# Patient Record
Sex: FEMALE | Race: WHITE | Employment: UNEMPLOYED | URBAN - METROPOLITAN AREA
[De-identification: names, ages, dates, MRNs, and addresses within clinical notes are randomized per-mention and may not be internally consistent; named-entity substitution may affect disease eponyms.]

---

## 2017-01-27 PROBLEM — D86.9 SARCOIDOSIS: Status: ACTIVE | Noted: 2017-01-27

## 2017-06-20 PROBLEM — E11.9 TYPE 2 DIABETES MELLITUS WITHOUT COMPLICATION, WITHOUT LONG-TERM CURRENT USE OF INSULIN (HCC): Status: ACTIVE | Noted: 2017-06-20

## 2018-03-14 ENCOUNTER — HOSPITAL ENCOUNTER (OUTPATIENT)
Age: 58
Discharge: HOME | End: 2018-03-16
Payer: COMMERCIAL

## 2018-03-14 ENCOUNTER — OFFICE VISIT (OUTPATIENT)
Dept: FAMILY MEDICINE CLINIC | Age: 58
End: 2018-03-14
Payer: COMMERCIAL

## 2018-03-14 VITALS
HEIGHT: 62 IN | DIASTOLIC BLOOD PRESSURE: 80 MMHG | RESPIRATION RATE: 16 BRPM | WEIGHT: 215 LBS | OXYGEN SATURATION: 98 % | TEMPERATURE: 97.2 F | HEART RATE: 80 BPM | SYSTOLIC BLOOD PRESSURE: 130 MMHG | BODY MASS INDEX: 39.56 KG/M2

## 2018-03-14 DIAGNOSIS — N30.00 ACUTE CYSTITIS WITHOUT HEMATURIA: Primary | ICD-10-CM

## 2018-03-14 DIAGNOSIS — D86.9 SARCOIDOSIS: ICD-10-CM

## 2018-03-14 DIAGNOSIS — E11.9 TYPE 2 DIABETES MELLITUS WITHOUT COMPLICATION, WITHOUT LONG-TERM CURRENT USE OF INSULIN (HCC): ICD-10-CM

## 2018-03-14 DIAGNOSIS — I10 ESSENTIAL HYPERTENSION: ICD-10-CM

## 2018-03-14 LAB
BILIRUBIN, POC: NORMAL
BLOOD URINE, POC: NORMAL
CLARITY, POC: CLEAR
COLOR, POC: YELLOW
GLUCOSE BLD-MCNC: 102 MG/DL
GLUCOSE URINE, POC: NORMAL
HBA1C MFR BLD: 6 %
KETONES, POC: NORMAL
LEUKOCYTE EST, POC: NORMAL
NITRITE, POC: NORMAL
PH, POC: 5.5
PROTEIN, POC: NORMAL
SPECIFIC GRAVITY, POC: 1
UROBILINOGEN, POC: 0.2

## 2018-03-14 PROCEDURE — G8482 FLU IMMUNIZE ORDER/ADMIN: HCPCS | Performed by: FAMILY MEDICINE

## 2018-03-14 PROCEDURE — G8427 DOCREV CUR MEDS BY ELIG CLIN: HCPCS | Performed by: FAMILY MEDICINE

## 2018-03-14 PROCEDURE — 87088 URINE BACTERIA CULTURE: CPT

## 2018-03-14 PROCEDURE — 3017F COLORECTAL CA SCREEN DOC REV: CPT | Performed by: FAMILY MEDICINE

## 2018-03-14 PROCEDURE — 82962 GLUCOSE BLOOD TEST: CPT | Performed by: FAMILY MEDICINE

## 2018-03-14 PROCEDURE — 83036 HEMOGLOBIN GLYCOSYLATED A1C: CPT | Performed by: FAMILY MEDICINE

## 2018-03-14 PROCEDURE — 99213 OFFICE O/P EST LOW 20 MIN: CPT | Performed by: FAMILY MEDICINE

## 2018-03-14 PROCEDURE — G8417 CALC BMI ABV UP PARAM F/U: HCPCS | Performed by: FAMILY MEDICINE

## 2018-03-14 PROCEDURE — 1036F TOBACCO NON-USER: CPT | Performed by: FAMILY MEDICINE

## 2018-03-14 PROCEDURE — 3044F HG A1C LEVEL LT 7.0%: CPT | Performed by: FAMILY MEDICINE

## 2018-03-14 PROCEDURE — 81003 URINALYSIS AUTO W/O SCOPE: CPT | Performed by: FAMILY MEDICINE

## 2018-03-14 PROCEDURE — 3014F SCREEN MAMMO DOC REV: CPT | Performed by: FAMILY MEDICINE

## 2018-03-16 LAB — URINE CULTURE, ROUTINE: NORMAL

## 2018-06-19 DIAGNOSIS — R73.9 HIGH BLOOD SUGAR: ICD-10-CM

## 2018-08-02 DIAGNOSIS — I10 ESSENTIAL HYPERTENSION: ICD-10-CM

## 2018-08-02 RX ORDER — METOPROLOL SUCCINATE 50 MG/1
50 TABLET, EXTENDED RELEASE ORAL DAILY
Qty: 30 TABLET | Refills: 3 | Status: SHIPPED | OUTPATIENT
Start: 2018-08-02 | End: 2019-06-03 | Stop reason: ALTCHOICE

## 2018-08-03 ENCOUNTER — TELEPHONE (OUTPATIENT)
Dept: FAMILY MEDICINE CLINIC | Age: 58
End: 2018-08-03

## 2018-08-03 DIAGNOSIS — E11.9 TYPE 2 DIABETES MELLITUS WITHOUT COMPLICATION, WITHOUT LONG-TERM CURRENT USE OF INSULIN (HCC): Primary | ICD-10-CM

## 2018-08-03 DIAGNOSIS — R73.9 HIGH BLOOD SUGAR: ICD-10-CM

## 2018-08-20 DIAGNOSIS — I10 ESSENTIAL HYPERTENSION: ICD-10-CM

## 2018-08-20 RX ORDER — SPIRONOLACTONE 25 MG/1
25 TABLET ORAL DAILY
Qty: 90 TABLET | Refills: 0 | Status: SHIPPED | OUTPATIENT
Start: 2018-08-20 | End: 2018-09-04 | Stop reason: SDUPTHER

## 2018-08-21 DIAGNOSIS — E11.9 TYPE 2 DIABETES MELLITUS WITHOUT COMPLICATION, WITHOUT LONG-TERM CURRENT USE OF INSULIN (HCC): ICD-10-CM

## 2018-08-21 NOTE — TELEPHONE ENCOUNTER
Patient called requesting a refill on Metformin 500 mg. Left message asking patient to call the office to clarify directions on the Metformin.

## 2018-09-04 ENCOUNTER — OFFICE VISIT (OUTPATIENT)
Dept: FAMILY MEDICINE CLINIC | Age: 58
End: 2018-09-04
Payer: COMMERCIAL

## 2018-09-04 VITALS
TEMPERATURE: 96.9 F | DIASTOLIC BLOOD PRESSURE: 80 MMHG | OXYGEN SATURATION: 97 % | SYSTOLIC BLOOD PRESSURE: 130 MMHG | HEART RATE: 78 BPM | WEIGHT: 198 LBS | BODY MASS INDEX: 36.44 KG/M2 | HEIGHT: 62 IN | RESPIRATION RATE: 16 BRPM

## 2018-09-04 DIAGNOSIS — F41.9 ANXIETY: ICD-10-CM

## 2018-09-04 DIAGNOSIS — D86.9 SARCOIDOSIS: ICD-10-CM

## 2018-09-04 DIAGNOSIS — L40.9 PSORIASIS: ICD-10-CM

## 2018-09-04 DIAGNOSIS — I10 ESSENTIAL HYPERTENSION: ICD-10-CM

## 2018-09-04 DIAGNOSIS — Z23 NEED FOR VACCINATION: ICD-10-CM

## 2018-09-04 DIAGNOSIS — M79.7 FIBROMYALGIA: ICD-10-CM

## 2018-09-04 DIAGNOSIS — E11.9 TYPE 2 DIABETES MELLITUS WITHOUT COMPLICATION, WITHOUT LONG-TERM CURRENT USE OF INSULIN (HCC): Primary | ICD-10-CM

## 2018-09-04 LAB — HBA1C MFR BLD: 5.7 %

## 2018-09-04 PROCEDURE — 83036 HEMOGLOBIN GLYCOSYLATED A1C: CPT | Performed by: FAMILY MEDICINE

## 2018-09-04 PROCEDURE — G8427 DOCREV CUR MEDS BY ELIG CLIN: HCPCS | Performed by: FAMILY MEDICINE

## 2018-09-04 PROCEDURE — 3017F COLORECTAL CA SCREEN DOC REV: CPT | Performed by: FAMILY MEDICINE

## 2018-09-04 PROCEDURE — 2022F DILAT RTA XM EVC RTNOPTHY: CPT | Performed by: FAMILY MEDICINE

## 2018-09-04 PROCEDURE — 90688 IIV4 VACCINE SPLT 0.5 ML IM: CPT | Performed by: FAMILY MEDICINE

## 2018-09-04 PROCEDURE — 1036F TOBACCO NON-USER: CPT | Performed by: FAMILY MEDICINE

## 2018-09-04 PROCEDURE — 3044F HG A1C LEVEL LT 7.0%: CPT | Performed by: FAMILY MEDICINE

## 2018-09-04 PROCEDURE — 90471 IMMUNIZATION ADMIN: CPT | Performed by: FAMILY MEDICINE

## 2018-09-04 PROCEDURE — 99214 OFFICE O/P EST MOD 30 MIN: CPT | Performed by: FAMILY MEDICINE

## 2018-09-04 PROCEDURE — G8417 CALC BMI ABV UP PARAM F/U: HCPCS | Performed by: FAMILY MEDICINE

## 2018-09-04 RX ORDER — HYDROCHLOROTHIAZIDE 25 MG/1
TABLET ORAL
Qty: 90 TABLET | Refills: 1 | Status: CANCELLED | OUTPATIENT
Start: 2018-09-04

## 2018-09-04 RX ORDER — SPIRONOLACTONE 25 MG/1
25 TABLET ORAL DAILY
Qty: 90 TABLET | Refills: 1 | Status: SHIPPED | OUTPATIENT
Start: 2018-09-04 | End: 2019-05-29 | Stop reason: SDUPTHER

## 2018-09-04 ASSESSMENT — PATIENT HEALTH QUESTIONNAIRE - PHQ9
SUM OF ALL RESPONSES TO PHQ9 QUESTIONS 1 & 2: 0
2. FEELING DOWN, DEPRESSED OR HOPELESS: 0
SUM OF ALL RESPONSES TO PHQ QUESTIONS 1-9: 0
1. LITTLE INTEREST OR PLEASURE IN DOING THINGS: 0
SUM OF ALL RESPONSES TO PHQ QUESTIONS 1-9: 0

## 2018-09-04 NOTE — PROGRESS NOTES
9/4/2018    Chief Complaint   Patient presents with    Diabetes     routine 6 [facility] check up no complaints     Hypertension    Flu Vaccine              Patient Active Problem List    Diagnosis Date Noted    Type 2 diabetes mellitus without complication, without long-term current use of insulin ([facility] 06/20/2017    Sarcoidosis 01/27/2017    Insomnia 06/17/2016    Anxiety 04/20/2015    Fibromyalgia 04/20/2015    Psoriasis 04/20/2015    SOB (shortness of breath) 04/20/2015    Diplopia 03/11/2014    HTN (hypertension) 03/11/2014     Subjective       Turn around with decreased weight  physian in [facility]  Auto immune solution less gluten and dairy    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 Type 2 diabetes mellitus without complication, without long-term current use of insulin ([facility])    - POCT glycosylated hemoglobin (Hb A1C)  -  DIABETES FOOT EXAM    2. Essential hypertension  Vitals:    09/04/18 0931   BP: 130/80   Pulse: 78   Resp: 16   Temp: 96.9 °F (36.1 °C)   SpO2: 97%       - spironolactone (ALDACTONE) 25 MG tablet; Take 1 tablet by mouth daily  Dispense: 90 tablet; Refill: 1  - hydrochlorothiazide (HYDRODIURIL) 25 MG tablet; TAKE 1 TABLET BY MOUTH ONCE A DAY  Dispense: 90 tablet; Refill: 1    3. Anxiety  ok    4. Fibromyalgia  ok    5. Psoriasis  ok    6.  Sarcoidosis  om        Goals      Blood Pressure < 140/90      HEMOGLOBIN A1C < 7.0      Reduce fat intake to X grams per day      Weight (lb) < 200         Goals      ROS     Review Of Systems    General:  No weight change no malaise no fatigue no change in appetite no sleep disturbance no fever/chills no night sweats  Skin:                 no abnormal pigmentation, no rash, no scaling,no itching,no Masses,no  hair or nail changes  Eyes:               no blurring, no diplopia, no  eye pain no glaucoma no cataracts  ENT:                 no hearing loss,no  Tinnitus,no  Vertigo,no 'chest cold'       Social History     Social History    Marital status:      Spouse name: N/A    Number of children: N/A    Years of education: N/A     Occupational History    Not on file. Social History Main Topics    Smoking status: Former Smoker     Packs/day: 0.20     Years: 1.00     Types: Cigarettes     Quit date: 1/1/1980    Smokeless tobacco: Never Used    Alcohol use No    Drug use: No    Sexual activity: Not on file     Other Topics Concern    Not on file     Social History Narrative    No narrative on file           Scheduled Meds:  Current Outpatient Prescriptions   Medication Sig Dispense Refill    metFORMIN (GLUCOPHAGE) 500 MG tablet Take 1 tablet by mouth daily (with breakfast) 90 tablet 1    spironolactone (ALDACTONE) 25 MG tablet Take 1 tablet by mouth daily 90 tablet 0    blood glucose test strips (FREESTYLE TEST STRIPS) strip 1 each by In Vitro route daily As needed. 100 each 3    metoprolol succinate (TOPROL XL) 50 MG extended release tablet Take 1 tablet by mouth daily 30 tablet 3    hydrochlorothiazide (HYDRODIURIL) 25 MG tablet TAKE 1 TABLET BY MOUTH ONCE A DAY 90 tablet 0    gabapentin (NEURONTIN) 300 MG capsule Take 300 mg by mouth 4 times daily       Handicap Placard MISC by Does not apply route 1 each 0    Lancets MISC Test daily 100 each 3    glucose monitoring kit (FREESTYLE) monitoring kit 1 kit by Does not apply route daily 1 kit 0    FREESTYLE LANCETS MISC 1 each by Does not apply route daily 159 each 3    folic acid (FOLVITE) 1 MG tablet Take 1 mg by mouth      sodium chloride, PF, 0.9 % injection Inject 10 ml intravenously as directed. Flush mediport with 10 cc of 0.9% Normal Saline pre and post infusion. Delma Rowe \"INFUSION PROTOCOL-DO NOT DELETE\"      acetaminophen (TYLENOL) 650 MG CR tablet INFUSION PROTOCOL DO NOT DELETEGive 650 mg po prn for temperature above 38 degrees Celsius for one dose only.       acetaminophen (TYLENOL) 325 MG tablet Take 650 mg by mouth      inFLIXimab (REMICADE) 100 MG injection Give initial infusion week 0, week 2, and every 4 weeks there after. Inject intravenously, give 5mg/kg IV in 250 ml Normal Saline. Infuse over 2 hours. No current facility-administered medications for this visit. Wt Readings from Last 3 Encounters:   09/04/18 198 lb (89.8 kg)   03/14/18 215 lb (97.5 kg)   01/19/18 215 lb 6.4 oz (97.7 kg)         Vitals:    09/04/18 0931   BP: 130/80   Pulse: 78   Resp: 16   Temp: 96.9 °F (36.1 °C)   SpO2: 97%         Physical Exam    Physical Exam:    General: No obesity, no cachexia  Skin:    Warm and dry. Not pale, not flushed , no lesions, No rash , no bruises  HEENT:   PERRLA, EOMI. Conjunctiva clear, no  Drainage, no jaundice, ext canals normal ,no cerumen impaction,TM's normal ,not injected ,no fluid . Normal nasal mucosa not boggy , not inflamed . Septum normal, turbinates not swollen, oral mucosa moist , lips normal, teeth normal, tongue normal, salivary glands normal, no sinus tenderness, tmj normal, throat no injected, no cobblestoning, no drainage , tonsils normal no exudates  Neck:    Supple,No JVD, No thyromegaly, No carotid bruit, no adenopathy  Cardiac:   PMI Normal,  RRR, No gallop , no  murmur. No S3, no S4, no abd aortic bruit, normal pulses, , no pedal edema  Lungs:   CTA, symmetrical,  No wheezes no rales, Normal percussion, no use of accessory muscles,   Abdomen:  Normal bowel sounds, abd soft, non-tender, no rebound no guarding,no hepatomegaly, no splenomegaly, no hernia, no ascites, normal palpation  Extremities:   No clubbing, no edema no cyanosis.  Pedal pulses ok  M/S:  Back normal,no kyphosis, no cva tenderness,  Head and neck normal rom, shoulders normal strength , , hips normal , gait normal , no cane or walker , no motor deficits, no clubbing, normal nails  Neurological:   A & O x 3, Moves all extremities,  No gross neuro deficits ,normal DTR, normal memory, normal

## 2018-12-04 DIAGNOSIS — E11.9 TYPE 2 DIABETES MELLITUS WITHOUT COMPLICATION, WITHOUT LONG-TERM CURRENT USE OF INSULIN (HCC): ICD-10-CM

## 2019-05-16 DIAGNOSIS — I10 ESSENTIAL HYPERTENSION: ICD-10-CM

## 2019-05-16 RX ORDER — SPIRONOLACTONE 25 MG/1
25 TABLET ORAL DAILY
Qty: 90 TABLET | Refills: 1 | OUTPATIENT
Start: 2019-05-16

## 2019-05-29 DIAGNOSIS — I10 ESSENTIAL HYPERTENSION: ICD-10-CM

## 2019-05-29 RX ORDER — SPIRONOLACTONE 25 MG/1
25 TABLET ORAL DAILY
Qty: 30 TABLET | Refills: 0 | Status: SHIPPED | OUTPATIENT
Start: 2019-05-29 | End: 2019-06-03 | Stop reason: SDUPTHER

## 2019-06-03 ENCOUNTER — OFFICE VISIT (OUTPATIENT)
Dept: FAMILY MEDICINE CLINIC | Age: 59
End: 2019-06-03
Payer: MEDICAID

## 2019-06-03 VITALS
WEIGHT: 205.8 LBS | SYSTOLIC BLOOD PRESSURE: 126 MMHG | BODY MASS INDEX: 35.13 KG/M2 | HEART RATE: 94 BPM | HEIGHT: 64 IN | DIASTOLIC BLOOD PRESSURE: 64 MMHG | TEMPERATURE: 98.5 F | RESPIRATION RATE: 16 BRPM | OXYGEN SATURATION: 98 %

## 2019-06-03 DIAGNOSIS — I10 ESSENTIAL HYPERTENSION: ICD-10-CM

## 2019-06-03 DIAGNOSIS — E11.9 TYPE 2 DIABETES MELLITUS WITHOUT COMPLICATION, WITHOUT LONG-TERM CURRENT USE OF INSULIN (HCC): Primary | ICD-10-CM

## 2019-06-03 DIAGNOSIS — E78.00 HYPERCHOLESTEROLEMIA: ICD-10-CM

## 2019-06-03 DIAGNOSIS — D86.9 SARCOIDOSIS: ICD-10-CM

## 2019-06-03 DIAGNOSIS — M79.7 FIBROMYALGIA: ICD-10-CM

## 2019-06-03 DIAGNOSIS — Z12.39 SCREENING BREAST EXAMINATION: ICD-10-CM

## 2019-06-03 LAB
CREATININE URINE POCT: 200
HBA1C MFR BLD: 5.7 %
MICROALBUMIN/CREAT 24H UR: 10 MG/G{CREAT}
MICROALBUMIN/CREAT UR-RTO: <30

## 2019-06-03 PROCEDURE — 99214 OFFICE O/P EST MOD 30 MIN: CPT | Performed by: FAMILY MEDICINE

## 2019-06-03 PROCEDURE — 82044 UR ALBUMIN SEMIQUANTITATIVE: CPT | Performed by: FAMILY MEDICINE

## 2019-06-03 PROCEDURE — 83036 HEMOGLOBIN GLYCOSYLATED A1C: CPT | Performed by: FAMILY MEDICINE

## 2019-06-03 RX ORDER — METHOTREXATE 25 MG/ML
INJECTION, SOLUTION INTRA-ARTERIAL; INTRAMUSCULAR; INTRAVENOUS
Refills: 3 | COMMUNITY
Start: 2019-05-03

## 2019-06-03 RX ORDER — SPIRONOLACTONE 25 MG/1
25 TABLET ORAL DAILY
Qty: 90 TABLET | Refills: 1 | Status: SHIPPED | OUTPATIENT
Start: 2019-06-03 | End: 2019-12-16 | Stop reason: SDUPTHER

## 2019-06-03 RX ORDER — ACYCLOVIR 400 MG/1
TABLET ORAL
Refills: 3 | COMMUNITY
Start: 2019-05-07

## 2019-06-03 NOTE — PROGRESS NOTES
6/3/2019    Chief Complaint   Patient presents with    Diabetes     Pt here for medication check-up -- POCT HgbA1c & Micro-Albumin done     Medication Refill    Other     Pt refused mammogram order              Patient Active Problem List    Diagnosis Date Noted    Type 2 diabetes mellitus without complication, without long-term current use of insulin ([facility] 06/20/2017    Sarcoidosis 01/27/2017    Insomnia 06/17/2016    Anxiety 04/20/2015    Fibromyalgia 04/20/2015    Psoriasis 04/20/2015    SOB (shortness of breath) 04/20/2015    Diplopia 03/11/2014    HTN (hypertension) 03/11/2014     Subjective    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 Type 2 diabetes mellitus without complication, without long-term current use of insulin ([facility])  Lab Results   Component Value Date    LABA1C 5.7 06/03/2019     No results found for: EAG    - POCT glycosylated hemoglobin (Hb A1C)  - POCT microalbumin  - metFORMIN (GLUCOPHAGE) 500 MG tablet; Take 1 tablet by mouth daily (with breakfast)  Dispense: 90 tablet; Refill: 1    2. Essential hypertension  ,vitals  Vitals:    06/03/19 1536   BP: 126/64   Pulse: 94   Resp: 16   Temp: 98.5 °F (36.9 °C)   SpO2: 98%       - spironolactone (ALDACTONE) 25 MG tablet; Take 1 tablet by mouth daily  Dispense: 90 tablet; Refill: 1    3. Sarcoidosis      4. Fibromyalgia    another MRI brain for blood vessels  Neuropathy not doing well  Biopsy of nerv no results  Sweat test for nerve. neurocoid is a possibility.     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        Goals      Blood Pressure < 140/90      HEMOGLOBIN A1C < 7.0      Reduce fat intake to X grams per day      Weight (lb) < 200         Goals      Review of Systems   Constitutional: Positive for Minutes per session: Not on file    Stress: Not on file   Relationships    Social connections:     Talks on phone: Not on file     Gets together: Not on file     Attends [employer] service: Not on file     Active member of club or organization: Not on file     Attends meetings of clubs or organizations: Not on file     Relationship status: Not on file    Intimate partner violence:     Fear of current or ex partner: Not on file     Emotionally abused: Not on file     Physically abused: Not on file     Forced sexual activity: Not on file   Other Topics Concern    Not on file   Social History Narrative    Not on file           Scheduled Meds:     Current Outpatient Medications   Medication Sig Dispense Refill    spironolactone (ALDACTONE) 25 MG tablet Take 1 tablet by mouth daily 30 tablet 0    metFORMIN (GLUCOPHAGE) 500 MG tablet Take 1 tablet by mouth daily (with breakfast) 90 tablet 1    blood glucose test strips (FREESTYLE TEST STRIPS) strip 1 each by In Vitro route daily As needed. 100 each 3    Handicap Placard MISC by Does not apply route 1 each 0    Lancets MISC Test daily 100 each 3    glucose monitoring kit (FREESTYLE) monitoring kit 1 kit by Does not apply route daily 1 kit 0    FREESTYLE LANCETS MISC 1 each by Does not apply route daily 512 each 3    folic acid (FOLVITE) 1 MG tablet Take 1 mg by mouth      sodium chloride, PF, 0.9 % injection Inject 10 ml intravenously as directed. Flush mediport with 10 cc of 0.9% Normal Saline pre and post infusion. Leila Chow \"INFUSION PROTOCOL-DO NOT DELETE\"      inFLIXimab (REMICADE) 100 MG injection Give initial infusion week 0, week 2, and every 4 weeks there after. Inject intravenously, give 5mg/kg IV in 250 ml Normal Saline. Infuse over 2 hours.       metoprolol succinate (TOPROL XL) 50 MG extended release tablet Take 1 tablet by mouth daily 30 tablet 3    hydrochlorothiazide (HYDRODIURIL) 25 MG tablet TAKE 1 TABLET BY MOUTH ONCE A DAY 90 tablet 0    gabapentin (NEURONTIN) 300 MG capsule Take 300 mg by mouth 4 times daily       acetaminophen (TYLENOL) 650 MG CR tablet INFUSION PROTOCOL DO NOT DELETEGive 650 mg po prn for temperature above 38 degrees Celsius for one dose only.  acetaminophen (TYLENOL) 325 MG tablet Take 650 mg by mouth       No current facility-administered medications for this visit. Wt Readings from Last 3 Encounters:   06/03/19 205 lb 12.8 oz (93.4 kg)   09/04/18 198 lb (89.8 kg)   03/14/18 215 lb (97.5 kg)         Vitals:    06/03/19 1536   BP: 126/64   Pulse: 94   Resp: 16   Temp: 98.5 °F (36.9 °C)   SpO2: 98%         Physical Exam    Physical Exam:    General: obesity,   Skin:    Warm and dry. Not pale, not flushed , no lesions, No rash , no bruises  HEENT:   PERRLA, EOMI. Conjunctiva clear, no  Drainage, no jaundice, ext canals normal ,no cerumenimpaction,TM's normal ,not injected ,no fluid . throat no injected, no cobblestoning, no drainage , tonsils normal no exudates  Neck:    Supple,No JVD, No thyromegaly, No carotid bruit, no adenopathy  Cardiac:   PMI Normal,RRR, No gallop , no  murmur. No S3, no S4, no abd aortic bruit, normal pulses, , no pedal edema  Lungs:   CTA, symmetrical,  No wheezes no rales, Normal percussion, no use of accessory muscles,   Abdomen:  Normal bowel sounds, abd soft, non-tender, no rebound no guarding,no hepatomegaly, no splenomegaly, no hernia, no ascites, normal palpation  Extremities:   No clubbing, no edema no cyanosis. Pedal pulses ok  M/S:  Back normal,no kyphosis, no cva tenderness,  Head and neck normal rom, shoulders normal strength , , hips normal , gait normal , no cane or walker , no motor deficits, no clubbing, normal nails  Neurological: A & O x 3, Moves all extremities,  No gross neuro deficits ,normal DTR, normal memory, normal judgement/insight, normal affect       Assessment:    1.  Type 2 diabetes mellitus without complication, without long-term current use of insulin ([facility]  Stable, cont meds recheck 6 mos    - POCT glycosylated hemoglobin (Hb A1C)  - POCT microalbumin  - metFORMIN (GLUCOPHAGE) 500 MG tablet; Take 1 tablet by mouth daily (with breakfast)  Dispense: 90 tablet; Refill: 1    2. Essential hypertension  Stable, cont meds recheck 6 mos    - spironolactone (ALDACTONE) 25 MG tablet; Take 1 tablet by mouth daily  Dispense: 90 tablet; Refill: 1    3. Sarcoidosis  Stable, cont meds recheck 6 mos      4. Fibromyalgia  Stable, cont meds recheck 6 mos                        4. Reviewed labs    5. No follow-ups on file.            Sheryl James M.D.    6/3/2019

## 2019-06-04 ENCOUNTER — HOSPITAL ENCOUNTER (OUTPATIENT)
Dept: MAMMOGRAPHY | Age: 59
Discharge: HOME | End: 2019-06-06
Payer: MEDICAID

## 2019-06-04 DIAGNOSIS — Z12.39 SCREENING BREAST EXAMINATION: ICD-10-CM

## 2019-06-04 PROCEDURE — 77063 BREAST TOMOSYNTHESIS BI: CPT

## 2019-06-06 LAB
CHOLESTEROL, TOTAL: 237 MG/DL
CHOLESTEROL/HDL RATIO: 3.34
HDLC SERPL-MCNC: 71 MG/DL (ref 35–70)
LDL CHOLESTEROL CALCULATED: 143 MG/DL (ref 0–160)
TRIGL SERPL-MCNC: 116 MG/DL
VLDLC SERPL CALC-MCNC: 23 MG/DL

## 2019-06-11 DIAGNOSIS — E78.00 HYPERCHOLESTEROLEMIA: ICD-10-CM

## 2019-06-11 ASSESSMENT — ENCOUNTER SYMPTOMS
SHORTNESS OF BREATH: 1
CHOKING: 0
STRIDOR: 0
CHEST TIGHTNESS: 0
COUGH: 0
GASTROINTESTINAL NEGATIVE: 1

## 2019-10-07 ENCOUNTER — NURSE ONLY (OUTPATIENT)
Dept: FAMILY MEDICINE CLINIC | Age: 59
End: 2019-10-07
Payer: MEDICAID

## 2019-10-07 DIAGNOSIS — Z23 NEED FOR INFLUENZA VACCINATION: Primary | ICD-10-CM

## 2019-10-07 PROCEDURE — 90653 IIV ADJUVANT VACCINE IM: CPT | Performed by: FAMILY MEDICINE

## 2019-10-07 PROCEDURE — 90471 IMMUNIZATION ADMIN: CPT | Performed by: FAMILY MEDICINE

## 2019-12-03 ENCOUNTER — HOSPITAL ENCOUNTER (OUTPATIENT)
Age: 59
Discharge: HOME | End: 2019-12-05
Payer: MEDICAID

## 2019-12-03 ENCOUNTER — OFFICE VISIT (OUTPATIENT)
Dept: FAMILY MEDICINE CLINIC | Age: 59
End: 2019-12-03
Payer: MEDICAID

## 2019-12-03 VITALS
WEIGHT: 204.4 LBS | SYSTOLIC BLOOD PRESSURE: 130 MMHG | RESPIRATION RATE: 16 BRPM | TEMPERATURE: 98.1 F | BODY MASS INDEX: 35.09 KG/M2 | DIASTOLIC BLOOD PRESSURE: 78 MMHG | OXYGEN SATURATION: 98 % | HEART RATE: 87 BPM

## 2019-12-03 DIAGNOSIS — I10 ESSENTIAL HYPERTENSION: ICD-10-CM

## 2019-12-03 DIAGNOSIS — D86.9 SARCOIDOSIS: ICD-10-CM

## 2019-12-03 DIAGNOSIS — R35.0 URINARY FREQUENCY: Primary | ICD-10-CM

## 2019-12-03 DIAGNOSIS — M79.7 FIBROMYALGIA: ICD-10-CM

## 2019-12-03 DIAGNOSIS — R35.0 URINARY FREQUENCY: ICD-10-CM

## 2019-12-03 DIAGNOSIS — R06.02 SOB (SHORTNESS OF BREATH): ICD-10-CM

## 2019-12-03 DIAGNOSIS — E11.9 TYPE 2 DIABETES MELLITUS WITHOUT COMPLICATION, WITHOUT LONG-TERM CURRENT USE OF INSULIN (HCC): ICD-10-CM

## 2019-12-03 LAB
BILIRUBIN, POC: NORMAL
BLOOD URINE, POC: NORMAL
CLARITY, POC: CLEAR
COLOR, POC: YELLOW
GLUCOSE URINE, POC: NORMAL
HBA1C MFR BLD: 5.3 %
KETONES, POC: NORMAL
LEUKOCYTE EST, POC: NORMAL
NITRITE, POC: NORMAL
PH, POC: 7
PROTEIN, POC: NORMAL
SPECIFIC GRAVITY, POC: <=1.005
UROBILINOGEN, POC: 0.2

## 2019-12-03 PROCEDURE — 87077 CULTURE AEROBIC IDENTIFY: CPT

## 2019-12-03 PROCEDURE — 87088 URINE BACTERIA CULTURE: CPT

## 2019-12-03 PROCEDURE — 99214 OFFICE O/P EST MOD 30 MIN: CPT | Performed by: FAMILY MEDICINE

## 2019-12-03 PROCEDURE — 83036 HEMOGLOBIN GLYCOSYLATED A1C: CPT | Performed by: FAMILY MEDICINE

## 2019-12-03 PROCEDURE — 87186 SC STD MICRODIL/AGAR DIL: CPT

## 2019-12-03 PROCEDURE — 81002 URINALYSIS NONAUTO W/O SCOPE: CPT | Performed by: FAMILY MEDICINE

## 2019-12-03 ASSESSMENT — PATIENT HEALTH QUESTIONNAIRE - PHQ9
SUM OF ALL RESPONSES TO PHQ QUESTIONS 1-9: 1
SUM OF ALL RESPONSES TO PHQ QUESTIONS 1-9: 1
SUM OF ALL RESPONSES TO PHQ9 QUESTIONS 1 & 2: 1
1. LITTLE INTEREST OR PLEASURE IN DOING THINGS: 0
2. FEELING DOWN, DEPRESSED OR HOPELESS: 1

## 2019-12-05 LAB
ORGANISM: ABNORMAL
URINE CULTURE, ROUTINE: ABNORMAL

## 2019-12-16 ENCOUNTER — TELEPHONE (OUTPATIENT)
Dept: FAMILY MEDICINE CLINIC | Age: 59
End: 2019-12-16

## 2019-12-16 DIAGNOSIS — I10 ESSENTIAL HYPERTENSION: ICD-10-CM

## 2019-12-16 DIAGNOSIS — E11.9 TYPE 2 DIABETES MELLITUS WITHOUT COMPLICATION, WITHOUT LONG-TERM CURRENT USE OF INSULIN (HCC): ICD-10-CM

## 2019-12-16 RX ORDER — SPIRONOLACTONE 25 MG/1
TABLET ORAL
Qty: 90 TABLET | Refills: 1 | Status: SHIPPED
Start: 2019-12-16 | End: 2020-06-17 | Stop reason: SDUPTHER

## 2019-12-17 ENCOUNTER — TELEPHONE (OUTPATIENT)
Dept: FAMILY MEDICINE CLINIC | Age: 59
End: 2019-12-17

## 2019-12-17 DIAGNOSIS — I10 ESSENTIAL HYPERTENSION: Primary | ICD-10-CM

## 2019-12-21 DIAGNOSIS — E11.9 TYPE 2 DIABETES MELLITUS WITHOUT COMPLICATION, WITHOUT LONG-TERM CURRENT USE OF INSULIN (HCC): ICD-10-CM

## 2020-06-17 RX ORDER — SPIRONOLACTONE 25 MG/1
25 TABLET ORAL DAILY
Qty: 90 TABLET | Refills: 1 | Status: SHIPPED
Start: 2020-06-17 | End: 2020-12-21 | Stop reason: SDUPTHER

## 2020-06-24 ENCOUNTER — VIRTUAL VISIT (OUTPATIENT)
Dept: FAMILY MEDICINE CLINIC | Age: 60
End: 2020-06-24
Payer: MEDICARE

## 2020-06-24 PROBLEM — E55.9 VITAMIN D DEFICIENCY: Status: ACTIVE | Noted: 2020-06-24

## 2020-06-24 PROBLEM — E78.00 HYPERCHOLESTEROLEMIA: Status: ACTIVE | Noted: 2020-06-24

## 2020-06-24 PROBLEM — D84.9 IMMUNOSUPPRESSED STATUS (HCC): Status: ACTIVE | Noted: 2020-06-24

## 2020-06-24 PROCEDURE — 99214 OFFICE O/P EST MOD 30 MIN: CPT | Performed by: FAMILY MEDICINE

## 2020-06-24 ASSESSMENT — PATIENT HEALTH QUESTIONNAIRE - PHQ9
SUM OF ALL RESPONSES TO PHQ QUESTIONS 1-9: 0
2. FEELING DOWN, DEPRESSED OR HOPELESS: 0
1. LITTLE INTEREST OR PLEASURE IN DOING THINGS: 0
SUM OF ALL RESPONSES TO PHQ QUESTIONS 1-9: 0
SUM OF ALL RESPONSES TO PHQ9 QUESTIONS 1 & 2: 0

## 2020-06-24 NOTE — PROGRESS NOTES
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 Authorizing Provider   metFORMIN (GLUCOPHAGE) 500 MG tablet Take 1 tablet by mouth daily (with breakfast) Yes Sharona Doherty MD   spironolactone (ALDACTONE) 25 MG tablet Take 1 tablet by mouth daily Yes Sharona Doherty MD   methotrexate Sodium (RHEUMATREX) 50 MG/2ML chemo injection INJECT 0.6 ML SUBCUTANEOUSLY ONCE EACH WEEK. Yes Historical Provider, MD   acyclovir (ZOVIRAX) 400 MG tablet TAKE 1 TABLET BY MOUTH EVERY 12 HOURS. Yes Historical Provider, MD   blood glucose test strips (FREESTYLE TEST STRIPS) strip 1 each by In Vitro route daily As needed.  Yes Sharona Doherty MD   Handicap Placard MISC by Does not apply route Yes Sharona Doherty MD   Lancets MISC Test daily Yes Sharona Doherty MD   glucose monitoring kit (FREESTYLE) monitoring kit 1 kit by Does not apply route daily Yes Sharona Doherty MD   FREESTYLE LANCETS MISC 1 each by Does not apply route daily Yes Sharona Doherty MD   folic acid (FOLVITE) 1 MG tablet Take 1 mg by mouth Yes Historical Provider, MD   sodium chloride, PF, 0.9 % injection Inject 10 ml intravenously as directed. Flush mediport with 10 cc of 0.9% Normal Saline pre and post infusion. Barbara Betancourt \"INFUSION PROTOCOL-DO NOT DELETE\" Yes Historical Provider, MD   inFLIXimab (REMICADE) 100 MG injection Give initial infusion week 0, week 2, and every 4 weeks there after. Inject intravenously, give 5mg/kg IV in 250 ml Normal Saline. Infuse over 2 hours. Yes Historical Provider, MD   acetaminophen (TYLENOL) 650 MG CR tablet INFUSION PROTOCOL DO NOT DELETEGive 650 mg po prn for temperature above 38 degrees Celsius for one dose only.   Historical Provider, MD   acetaminophen (TYLENOL) 325 MG tablet Take 650 mg by mouth  Historical Provider, MD       Social History     Tobacco Use    Smoking status: Former Smoker     Packs/day: 0.20     Years: 1.00     Pack years: 0.20     Types: Cigarettes     Last attempt to quit: 1980     Years since quittin.5    Smokeless tobacco: Never Used   Substance Use Topics    Alcohol use: No    Drug use: No        Allergies   Allergen Reactions    Hctz [Hydrochlorothiazide]      Unknown reaction    Phenytoin Sodium Extended Nausea Only    Sulfa Antibiotics      Unknown reaction   ,   Past Medical History:   Diagnosis Date    Abdominal pain     Anxiety     Diplopia     not currently    Fibromyalgia     Heartburn     HTN (hypertension)     PMR (polymyalgia rheumatica) (HCC)     Psoriasis     Shortness of breath     with 'chest cold'       PHYSICAL EXAMINATION:  [ INSTRUCTIONS:  \"[x]\" Indicates a positive item  \"[]\" Indicates a negative item  -- DELETE ALL ITEMS NOT EXAMINED]  Vital Signs: (As obtained by patient/caregiver or practitioner observation)    Blood pressure-  Heart rate-    Respiratory rate-    Temperature-  Pulse oximetry- Constitutional: [x] Appears well-developed and well-nourished [x] No apparent distress      [] Abnormal-   Mental status  [x] Alert and awake  [x] Oriented to person/place/time [x]Able to follow commands      Eyes:  EOM    []  Normal  [] Abnormal-  Sclera  []  Normal  [] Abnormal -         Discharge []  None visible  [] Abnormal -    HENT:   [x] Normocephalic, atraumatic. [] Abnormal   [] Mouth/Throat: Mucous membranes are moist.     External Ears [] Normal  [] Abnormal-     Neck: [] No visualized mass     Pulmonary/Chest: [x] Respiratory effort normal.  [] No visualized signs of difficulty breathing or respiratory distress        [] Abnormal-      Musculoskeletal:   [] Normal gait with no signs of ataxia         [] Normal range of motion of neck        [] Abnormal-       Neurological:        [x] No Facial Asymmetry (Cranial nerve 7 motor function) (limited exam to video visit)          [] No gaze palsy        [] Abnormal-         Skin:        [] No significant exanthematous lesions or discoloration noted on facial skin         [] Abnormal-            Psychiatric:       [x] Normal Affect [] No Hallucinations        [] Abnormal-     Other pertinent observable physical exam findings-     ASSESSMENT/PLAN:  1. Essential hypertension  Has bp checked at [address]    2. Fibromyalgia  Stable unchanged    3. Psoriasis      4. Sarcoidosis  Sees CC and OPHTHO for eval  On rheumatrix for this   On remicade  . 5. Type 2 diabetes mellitus without complication, without long-term current use of insulin ([facility]  . Lab Results   Component Value Date    LABA1C 5.3 12/03/2019     No results found for: EAG    - Hemoglobin A1C; Future  - MICROALBUMIN / CREATININE URINE RATIO; Future    6. Hypercholesterolemia  levated last year   No meds   Will recheck and mail rx  - Lipid Panel; [facility]    7. Vitamin D deficiency  19 from last fall on vit d but not in summer   Will recheck   - Vitamin D 25 Hydroxy; Future    8.  Immunosuppressed status [facility])  On mtx and remicaid through [facility]      Return in about 6 months (around 12/24/2020). TeleMedicine video visit    This visit was performed as a virtual video visit using a synchronous, two-way, audio-video telehealth technology platform. Patient identification was verified at the start of the visit, including the patient's telephone number and physical location. I discussed with the patient the nature of our telehealth visits, that:     1. Due to the nature of an audio- video modality, the only components of a physical exam that could be done are the elements supported by direct observation. 2. I would evaluate the patient and recommend diagnostics and treatments based on my assessment. 3. If it was felt that the patient should be evaluated in clinic or an emergency room setting, then they would be directed there. 4. Our sessions are not being recorded and that personal health information is protected. 5. Our team would provide follow up care in person if/when the patient needs it. Patient does agree to proceed with telemedicine consultation. Patient's location: home address in [facility]  Physician  location other address in [facility] other people involved in call  no        Time spent: Greater than Not billed by time    This visit was completed virtually using [employer]. me    --Sadia Bowman MD on 6/24/2020 at 11:16 AM    An electronic signature was used to authenticate this note.

## 2020-07-30 LAB
AVERAGE GLUCOSE: 126
CHOLESTEROL, TOTAL: 254 MG/DL
CHOLESTEROL/HDL RATIO: 3.53
CREATININE, URINE: NORMAL
HBA1C MFR BLD: 6 %
HDLC SERPL-MCNC: 72 MG/DL (ref 35–70)
LDL CHOLESTEROL CALCULATED: 160 MG/DL (ref 0–160)
MICROALBUMIN/CREAT 24H UR: NORMAL MG/G{CREAT}
MICROALBUMIN/CREAT UR-RTO: NORMAL
TRIGL SERPL-MCNC: 112 MG/DL
VITAMIN D 25-HYDROXY: NORMAL
VITAMIN D2, 25 HYDROXY: NORMAL
VITAMIN D3,25 HYDROXY: NORMAL
VLDLC SERPL CALC-MCNC: 22 MG/DL

## 2020-08-04 ENCOUNTER — TELEPHONE (OUTPATIENT)
Dept: FAMILY MEDICINE CLINIC | Age: 60
End: 2020-08-04

## 2020-08-04 NOTE — TELEPHONE ENCOUNTER
----- Message from Hunter Lee MD sent at 8/4/2020  5:59 AM EDT -----  Labs look good recheck 6 mos. Needs appt December.   awv initial .

## 2020-12-14 RX ORDER — SPIRONOLACTONE 25 MG/1
TABLET ORAL
Qty: 90 TABLET | Refills: 1 | OUTPATIENT
Start: 2020-12-14

## 2020-12-21 ENCOUNTER — OFFICE VISIT (OUTPATIENT)
Dept: FAMILY MEDICINE CLINIC | Age: 60
End: 2020-12-21
Payer: MEDICARE

## 2020-12-21 VITALS
TEMPERATURE: 97.4 F | SYSTOLIC BLOOD PRESSURE: 138 MMHG | DIASTOLIC BLOOD PRESSURE: 78 MMHG | OXYGEN SATURATION: 98 % | BODY MASS INDEX: 37.94 KG/M2 | HEART RATE: 86 BPM | WEIGHT: 222.2 LBS | RESPIRATION RATE: 16 BRPM | HEIGHT: 64 IN

## 2020-12-21 LAB — HBA1C MFR BLD: 5.8 %

## 2020-12-21 PROCEDURE — G0438 PPPS, INITIAL VISIT: HCPCS | Performed by: FAMILY MEDICINE

## 2020-12-21 PROCEDURE — 3044F HG A1C LEVEL LT 7.0%: CPT | Performed by: FAMILY MEDICINE

## 2020-12-21 PROCEDURE — 3017F COLORECTAL CA SCREEN DOC REV: CPT | Performed by: FAMILY MEDICINE

## 2020-12-21 PROCEDURE — 83036 HEMOGLOBIN GLYCOSYLATED A1C: CPT | Performed by: FAMILY MEDICINE

## 2020-12-21 RX ORDER — SPIRONOLACTONE 25 MG/1
25 TABLET ORAL DAILY
Qty: 90 TABLET | Refills: 1 | Status: SHIPPED
Start: 2020-12-21 | End: 2021-06-21 | Stop reason: SDUPTHER

## 2020-12-21 SDOH — ECONOMIC STABILITY: TRANSPORTATION INSECURITY
IN THE PAST 12 MONTHS, HAS LACK OF TRANSPORTATION KEPT YOU FROM MEETINGS, WORK, OR FROM GETTING THINGS NEEDED FOR DAILY LIVING?: NO

## 2020-12-21 SDOH — ECONOMIC STABILITY: TRANSPORTATION INSECURITY
IN THE PAST 12 MONTHS, HAS THE LACK OF TRANSPORTATION KEPT YOU FROM MEDICAL APPOINTMENTS OR FROM GETTING MEDICATIONS?: NO

## 2020-12-21 SDOH — ECONOMIC STABILITY: INCOME INSECURITY: HOW HARD IS IT FOR YOU TO PAY FOR THE VERY BASICS LIKE FOOD, HOUSING, MEDICAL CARE, AND HEATING?: SOMEWHAT HARD

## 2020-12-21 SDOH — ECONOMIC STABILITY: FOOD INSECURITY: WITHIN THE PAST 12 MONTHS, YOU WORRIED THAT YOUR FOOD WOULD RUN OUT BEFORE YOU GOT MONEY TO BUY MORE.: NEVER TRUE

## 2020-12-21 SDOH — ECONOMIC STABILITY: FOOD INSECURITY: WITHIN THE PAST 12 MONTHS, THE FOOD YOU BOUGHT JUST DIDN'T LAST AND YOU DIDN'T HAVE MONEY TO GET MORE.: NEVER TRUE

## 2020-12-21 ASSESSMENT — PATIENT HEALTH QUESTIONNAIRE - PHQ9
SUM OF ALL RESPONSES TO PHQ QUESTIONS 1-9: 0
SUM OF ALL RESPONSES TO PHQ9 QUESTIONS 1 & 2: 0
SUM OF ALL RESPONSES TO PHQ QUESTIONS 1-9: 0
1. LITTLE INTEREST OR PLEASURE IN DOING THINGS: 0
2. FEELING DOWN, DEPRESSED OR HOPELESS: 0
SUM OF ALL RESPONSES TO PHQ QUESTIONS 1-9: 0

## 2020-12-21 ASSESSMENT — LIFESTYLE VARIABLES
HAS A RELATIVE, FRIEND, DOCTOR, OR ANOTHER HEALTH PROFESSIONAL EXPRESSED CONCERN ABOUT YOUR DRINKING OR SUGGESTED YOU CUT DOWN: 0
HOW OFTEN DURING THE LAST YEAR HAVE YOU HAD A FEELING OF GUILT OR REMORSE AFTER DRINKING: 0
HOW OFTEN DURING THE LAST YEAR HAVE YOU FOUND THAT YOU WERE NOT ABLE TO STOP DRINKING ONCE YOU HAD STARTED: 0
AUDIT TOTAL SCORE: 1
HOW OFTEN DURING THE LAST YEAR HAVE YOU NEEDED AN ALCOHOLIC DRINK FIRST THING IN THE MORNING TO GET YOURSELF GOING AFTER A NIGHT OF HEAVY DRINKING: 0
HOW OFTEN DO YOU HAVE SIX OR MORE DRINKS ON ONE OCCASION: 0
HOW OFTEN DURING THE LAST YEAR HAVE YOU FAILED TO DO WHAT WAS NORMALLY EXPECTED FROM YOU BECAUSE OF DRINKING: 0
HOW OFTEN DO YOU HAVE A DRINK CONTAINING ALCOHOL: 1
HOW OFTEN DURING THE LAST YEAR HAVE YOU BEEN UNABLE TO REMEMBER WHAT HAPPENED THE NIGHT BEFORE BECAUSE YOU HAD BEEN DRINKING: 0
HOW MANY STANDARD DRINKS CONTAINING ALCOHOL DO YOU HAVE ON A TYPICAL DAY: 0
AUDIT-C TOTAL SCORE: 1
HAVE YOU OR SOMEONE ELSE BEEN INJURED AS A RESULT OF YOUR DRINKING: 0

## 2020-12-21 NOTE — PROGRESS NOTES
Medicare Annual Wellness Visit  Name: Glenn HOLM Date: 2020   MRN: [id number] Sex: Female   Age: 56 y.o. Ethnicity: Non-/Non    : 1960 Race: Alrin Robert is here for Medicare AWV, Diabetes (6 month check-up), Medication Refill, and Discuss Labs (Pt had labs done at [facility] in November)    Screenings for behavioral, psychosocial and functional/safety risks, and cognitive dysfunction are all negative except as indicated below. These results, as well as other patient data from the [address] form, are documented in Flowsheets linked to this Encounter. Allergies   Allergen Reactions    Hctz [Hydrochlorothiazide]      Unknown reaction    Phenytoin Sodium Extended Nausea Only    Sulfa Antibiotics      Unknown reaction         Prior to Visit Medications    Medication Sig Taking? Authorizing Provider   metFORMIN (GLUCOPHAGE) 500 MG tablet Take 1 tablet by mouth daily (with breakfast) Yes Hanny Street MD   spironolactone (ALDACTONE) 25 MG tablet Take 1 tablet by mouth daily Yes Hanny Street MD   methotrexate Sodium (RHEUMATREX) 50 MG/2ML chemo injection INJECT 0.6 ML SUBCUTANEOUSLY ONCE EACH WEEK. Yes Historical Provider, MD   acyclovir (ZOVIRAX) 400 MG tablet TAKE 1 TABLET BY MOUTH EVERY 12 HOURS. Yes Historical Provider, MD   blood glucose test strips (FREESTYLE TEST STRIPS) strip 1 each by In Vitro route daily As needed.  Yes Hanny Street MD   Handicap Placard MISC by Does not apply route Yes Hanny Street MD   Lancets MISC Test daily Yes Hanny Street MD   glucose monitoring kit (FREESTYLE) monitoring kit 1 kit by Does not apply route daily Yes Hanny Street MD   FREESTYLE LANCETS MISC 1 each by Does not apply route daily Yes Hanny Street MD   folic acid (FOLVITE) 1 MG tablet Take 1 mg by mouth Yes Historical Provider, MD sodium chloride, PF, 0.9 % injection Inject 10 ml intravenously as directed. Flush mediport with 10 cc of 0.9% Normal Saline pre and post infusion. Kendy Jones \"INFUSION PROTOCOL-DO NOT DELETE\" Yes Historical Provider, MD   inFLIXimab (REMICADE) 100 MG injection Give initial infusion week 0, week 2, and every 4 weeks there after. Inject intravenously, give 5mg/kg IV in 250 ml Normal Saline. Infuse over 2 hours. Yes Historical Provider, MD   acetaminophen (TYLENOL) 650 MG CR tablet INFUSION PROTOCOL DO NOT DELETEGive 650 mg po prn for temperature above 38 degrees Celsius for one dose only. Historical Provider, MD   acetaminophen (TYLENOL) 325 MG tablet Take 650 mg by mouth  Historical Provider, MD         Past Medical History:   Diagnosis Date    Abdominal pain     Anxiety     Diplopia     not currently    Fibromyalgia     Heartburn     HTN (hypertension)     PMR (polymyalgia rheumatica) (HCC)     Psoriasis     Shortness of breath     with 'chest cold'       Past Surgical History:   Procedure Laterality Date    BLADDER SUSPENSION      COLONOSCOPY  7/14/15    ENDOSCOPY, COLON, DIAGNOSTIC  7/14/15    TUBAL LIGATION      WISDOM TOOTH EXTRACTION  1980         Family History   Problem Relation Age of Onset    Cancer Mother     High Blood Pressure Mother     Thyroid Disease Mother     Heart Attack Father        CareTeam (Including outside providers/suppliers regularly involved in providing care):   Patient Care Team:  Janis Vizcaino MD as PCP - General  Janis Vizcaino MD as PCP - [facility] Empaneled Provider    Wt Readings from Last 3 Encounters:   12/21/20 222 lb 3.2 oz (100.8 kg)   12/03/19 204 lb 6.4 oz (92.7 kg)   06/03/19 205 lb 12.8 oz (93.4 kg)     Vitals:    12/21/20 1019 12/21/20 1036   BP: (!) 160/98 (!) 146/92   Pulse: 86    Resp: 16    Temp: 97.4 °F (36.3 °C)    SpO2: 98%    Weight: 222 lb 3.2 oz (100.8 kg)    Height: 5' 4\" (1.626 m)      Body mass index is 38.14 kg/m². Based upon direct observation of the patient, evaluation of cognition reveals recent and remote memory intact. General Appearance: alert and oriented to person, place and time, well developed and well- nourished, in no acute distress  Skin: warm and dry, no rash or erythema  Head: normocephalic and atraumatic  Eyes: pupils equal, round, and reactive to light, extraocular eye movements intact, conjunctivae normal  ENT: tympanic membrane, external ear and ear canal normal bilaterally, nose without deformity, nasal mucosa and turbinates normal without polyps  Neck: supple and non-tender without mass, no thyromegaly or thyroid nodules, no cervical lymphadenopathy  Pulmonary/Chest: clear to auscultation bilaterally- no wheezes, rales or rhonchi, normal air movement, no respiratory distress  Cardiovascular: normal rate, regular rhythm, normal S1 and S2, no murmurs, rubs, clicks, or gallops, distal pulses intact, no carotid bruits  Abdomen: soft, non-tender, non-distended, normal bowel sounds, no masses or organomegaly  Extremities: no cyanosis, clubbing or edema  Musculoskeletal: normal range of motion, no joint swelling, deformity or tenderness  Neurologic: reflexes normal and symmetric, no cranial nerve deficit, gait, coordination and speech normal    Patient's complete Health Risk Assessment and screening values have been reviewed and are found in Flowsheets. The following problems were reviewed today and where indicated follow up appointments were made and/or referrals ordered. Positive Risk Factor Screenings with Interventions:     Fall Risk:  Timed Up and Go Test > 12 seconds?  (Complete if either Fall Risk answers are Yes): no  2 or more falls in past year?: (!) yes  Fall with injury in past year?: no  Fall Risk Interventions:    · Home safety tips provided          General Health and ACP:  General  In general, how would you say your health is?: Fair In the past 7 days, have you experienced any of the following? New or Increased Pain, New or Increased Fatigue, Loneliness, Social Isolation, Stress or Anger?: None of These  Do you get the social and emotional support that you need?: Yes  Do you have a Living Will?: (!) No  Advance Directives     Power of Shira Conway Will ACP-Advance Directive ACP-Power of     Not on File Filed on 04/20/13 Filed Not on File      General Health Risk Interventions:  · No Living Will: Advance Care Planning addressed with patient today    Health Habits/Nutrition:  Health Habits/Nutrition  Do you exercise for at least 20 minutes 2-3 times per week?: Yes  Have you lost any weight without trying in the past 3 months?: No  Do you eat fewer than 2 meals per day?: No  Have you seen a dentist within the past year?: (!) No  Body mass index: (!) 38.14  Health Habits/Nutrition Interventions:  · Dental exam overdue:  patient encouraged to make appointment with his/her dentist    Hearing/Vision:  No exam data present  Hearing/Vision  Do you or your family notice any trouble with your hearing?: No  Do you have difficulty driving, watching TV, or doing any of your daily activities because of your eyesight?: No  Have you had an eye exam within the past year?: (!) No  Hearing/Vision Interventions:  · Vision concerns:  patient encouraged to make appointment with his/her eye specialist     ADL:  ADLs  In the past 7 days, did you need help from others to perform any of the following everyday activities? Eating, dressing, grooming, bathing, toileting, or walking/balance?: (!) Walking/Balance  In the past 7 days, did you need help from others to take care of any of the following?  Laundry, housekeeping, banking/finances, shopping, telephone use, food preparation, transportation, or taking medications?: None  ADL Interventions:  · Patient declines any further evaluation/treatment for this issue    Personalized Preventive Plan Current Health Maintenance Status  Immunization History   Administered Date(s) Administered    Influenza Vaccine, unspecified formulation 09/12/2017    Influenza Virus Vaccine 08/30/2016, 09/12/2017, 09/04/2018    Influenza, High-dose, Johnston Pulse, 65 yrs +, IM (Fluzone) 10/22/2020    Influenza, Quadv, IM, (6 mo and older Fluzone, Flulaval, Fluarix and 3 yrs and older Afluria) 08/30/2016, 09/12/2017, 09/04/2018    Influenza, Triv, inactivated, subunit, adjuvanted, IM (Fluad 65 yrs and older) 10/07/2019    Pneumococcal Conjugate 13-valent ([id number]) 09/12/2017    Pneumococcal Polysaccharide ([id number]) 08/30/2016        Health Maintenance   Topic Date Due    Hepatitis C screen  1960    HIV screen  06/25/1973    DTaP/Tdap/Td vaccine (1 - Tdap) 06/25/1977    Shingles Vaccine (1 of 2) 06/25/2008    Potassium monitoring  03/02/2018    Creatinine monitoring  03/02/2018    Diabetic retinal exam  03/23/2018    Cervical cancer screen  10/19/2018    Diabetic foot exam  09/04/2019    Annual Wellness Visit (AWV)  07/16/2020    Breast cancer screen  06/04/2021    A1C test (Diabetic or Prediabetic)  07/30/2021    Diabetic microalbuminuria test  07/30/2021    Lipid screen  07/30/2021    Pneumococcal 0-64 years Vaccine (3 of 3 - PPSV23) 08/30/2021    Colon cancer screen colonoscopy  07/14/2025    Flu vaccine  Completed    Hepatitis A vaccine  Aged Out    Hib vaccine  Aged Out    Meningococcal (ACWY) vaccine  Aged Out     Recommendations for Reasult Due: see orders and patient instructions/AVS.    Recommended screening schedule for the next 5-10 years is provided to the patient in written form: see Patient Instructions/AVS.    Juana Thompson was seen today for medicare awv, diabetes, medication refill and discuss labs.     Diagnoses and all orders for this visit:    Routine general medical examination at a health care facility    Essential hypertension     Vitals:    12/21/20 2042   BP: 138/78

## 2020-12-21 NOTE — PATIENT INSTRUCTIONS
Personalized Preventive Plan for Logan Weinstein Pay - 12/21/2020  Medicare offers a range of preventive health benefits. Some of the tests and screenings are paid in full while other may be subject to a deductible, co-insurance, and/or copay. Some of these benefits include a comprehensive review of your medical history including lifestyle, illnesses that may run in your family, and various assessments and screenings as appropriate. After reviewing your medical record and screening and assessments performed today your provider may have ordered immunizations, labs, imaging, and/or referrals for you. A list of these orders (if applicable) as well as your Preventive Care list are included within your After Visit Summary for your review. Other Preventive Recommendations:    · A preventive eye exam performed by an eye specialist is recommended every 1-2 years to screen for glaucoma; cataracts, macular degeneration, and other eye disorders. · A preventive dental visit is recommended every 6 months. · Try to get at least 150 minutes of exercise per week or 10,000 steps per day on a pedometer . · Order or download the FREE \"Exercise & Physical Activity: Your Everyday Guide\" from [employer]. Call 7-253.777.8688 or search [employer] online. · You need 7394-8189 mg of calcium and 6423-7275 IU of vitamin D per day. It is possible to meet your calcium requirement with diet alone, but a vitamin D supplement is usually necessary to meet this goal.  · When exposed to the sun, use a sunscreen that protects against both UVA and UVB radiation with an SPF of 30 or greater. Reapply every 2 to 3 hours or after sweating, drying off with a towel, or swimming. · Always wear a seat belt when traveling in a car. Always wear a helmet when riding a bicycle or motorcycle.

## 2021-04-01 DIAGNOSIS — E11.9 TYPE 2 DIABETES MELLITUS WITHOUT COMPLICATION, WITHOUT LONG-TERM CURRENT USE OF INSULIN (HCC): ICD-10-CM

## 2021-06-21 ENCOUNTER — OFFICE VISIT (OUTPATIENT)
Dept: FAMILY MEDICINE CLINIC | Age: 61
End: 2021-06-21
Payer: MEDICARE

## 2021-06-21 VITALS
BODY MASS INDEX: 37.25 KG/M2 | SYSTOLIC BLOOD PRESSURE: 140 MMHG | WEIGHT: 218.2 LBS | DIASTOLIC BLOOD PRESSURE: 90 MMHG | HEART RATE: 111 BPM | OXYGEN SATURATION: 98 % | TEMPERATURE: 97 F | RESPIRATION RATE: 16 BRPM | HEIGHT: 64 IN

## 2021-06-21 DIAGNOSIS — D86.9 SARCOIDOSIS: ICD-10-CM

## 2021-06-21 DIAGNOSIS — E66.01 SEVERE OBESITY (BMI 35.0-35.9 WITH COMORBIDITY) (HCC): ICD-10-CM

## 2021-06-21 DIAGNOSIS — Z12.31 OTHER SCREENING MAMMOGRAM: ICD-10-CM

## 2021-06-21 DIAGNOSIS — Z12.4 PAP SMEAR FOR CERVICAL CANCER SCREENING: ICD-10-CM

## 2021-06-21 DIAGNOSIS — E55.9 VITAMIN D DEFICIENCY: ICD-10-CM

## 2021-06-21 DIAGNOSIS — I10 ESSENTIAL HYPERTENSION: ICD-10-CM

## 2021-06-21 DIAGNOSIS — F41.9 ANXIETY: Primary | ICD-10-CM

## 2021-06-21 DIAGNOSIS — L40.9 PSORIASIS: ICD-10-CM

## 2021-06-21 DIAGNOSIS — D84.9 IMMUNOSUPPRESSED STATUS (HCC): ICD-10-CM

## 2021-06-21 DIAGNOSIS — E11.9 TYPE 2 DIABETES MELLITUS WITHOUT COMPLICATION, WITHOUT LONG-TERM CURRENT USE OF INSULIN (HCC): ICD-10-CM

## 2021-06-21 DIAGNOSIS — E78.00 HYPERCHOLESTEROLEMIA: ICD-10-CM

## 2021-06-21 LAB
CREATININE URINE POCT: NORMAL
HBA1C MFR BLD: 6 %
MICROALBUMIN/CREAT 24H UR: NORMAL MG/G{CREAT}
MICROALBUMIN/CREAT UR-RTO: NORMAL

## 2021-06-21 PROCEDURE — 82044 UR ALBUMIN SEMIQUANTITATIVE: CPT | Performed by: FAMILY MEDICINE

## 2021-06-21 PROCEDURE — 99214 OFFICE O/P EST MOD 30 MIN: CPT | Performed by: FAMILY MEDICINE

## 2021-06-21 PROCEDURE — 83036 HEMOGLOBIN GLYCOSYLATED A1C: CPT | Performed by: FAMILY MEDICINE

## 2021-06-21 PROCEDURE — G8427 DOCREV CUR MEDS BY ELIG CLIN: HCPCS | Performed by: FAMILY MEDICINE

## 2021-06-21 PROCEDURE — 3017F COLORECTAL CA SCREEN DOC REV: CPT | Performed by: FAMILY MEDICINE

## 2021-06-21 PROCEDURE — 2022F DILAT RTA XM EVC RTNOPTHY: CPT | Performed by: FAMILY MEDICINE

## 2021-06-21 PROCEDURE — 3044F HG A1C LEVEL LT 7.0%: CPT | Performed by: FAMILY MEDICINE

## 2021-06-21 PROCEDURE — 1036F TOBACCO NON-USER: CPT | Performed by: FAMILY MEDICINE

## 2021-06-21 PROCEDURE — G8417 CALC BMI ABV UP PARAM F/U: HCPCS | Performed by: FAMILY MEDICINE

## 2021-06-21 RX ORDER — SPIRONOLACTONE 25 MG/1
25 TABLET ORAL DAILY
Qty: 90 TABLET | Refills: 1 | Status: SHIPPED
Start: 2021-06-21 | End: 2021-12-17 | Stop reason: SDUPTHER

## 2021-06-21 RX ORDER — SPIRONOLACTONE 25 MG/1
TABLET ORAL
Qty: 90 TABLET | Refills: 1 | OUTPATIENT
Start: 2021-06-21

## 2021-06-21 ASSESSMENT — PATIENT HEALTH QUESTIONNAIRE - PHQ9
SUM OF ALL RESPONSES TO PHQ9 QUESTIONS 1 & 2: 0
SUM OF ALL RESPONSES TO PHQ QUESTIONS 1-9: 0
1. LITTLE INTEREST OR PLEASURE IN DOING THINGS: 0
2. FEELING DOWN, DEPRESSED OR HOPELESS: 0

## 2021-06-21 NOTE — PROGRESS NOTES
HPI:  Jin Echevarria presents for her yearly well-woman visit. Past Medical History:   Diagnosis Date    Abdominal pain     Anxiety     Diplopia     not currently    Fibromyalgia     Heartburn     HTN (hypertension)     PMR (polymyalgia rheumatica) (HCC)     Psoriasis     Shortness of breath     with 'chest cold'      OB History   No obstetric history on file. Family History   Problem Relation Age of Onset    Cancer Mother     High Blood Pressure Mother     Thyroid Disease Mother     Heart Attack Father      Social History     Socioeconomic History    Marital status:      Spouse name: Not on file    Number of children: Not on file    Years of education: Not on file    Highest education level: Not on file   Occupational History    Not on file   Tobacco Use    Smoking status: Former Smoker     Packs/day: 0.20     Years: 1.00     Pack years: 0.20     Types: Cigarettes     Quit date: 1980     Years since quittin.4    Smokeless tobacco: Never Used   Substance and Sexual Activity    Alcohol use: No    Drug use: No    Sexual activity: Not on file   Other Topics Concern    Not on file   Social History Narrative    Not on file     Social Determinants of Health     Financial Resource Strain: Medium Risk    Difficulty of Paying Living Expenses: Somewhat hard   Food Insecurity: No Food Insecurity    Worried About Running Out of Food in the Last Year: Never true    Marta of Food in the Last Year: Never true   Transportation Needs: No Transportation Needs    Lack of Transportation (Medical): No    Lack of Transportation (Non-Medical):  No   Physical Activity:     Days of Exercise per Week:     Minutes of Exercise per Session:    Stress:     Feeling of Stress :    Social Connections:     Frequency of Communication with Friends and Family:     Frequency of Social Gatherings with Friends and Family:     Attends Buddhist Services:     Active Member of Clubs or Organizations:  Attends Club or Organization Meetings:     Marital Status:    Intimate Partner Violence:     Fear of Current or Ex-Partner:     Emotionally Abused:     Physically Abused:     Sexually Abused:        Current Outpatient Medications:     spironolactone (ALDACTONE) 25 MG tablet, Take 1 tablet by mouth daily, Disp: 90 tablet, Rfl: 1    metFORMIN (GLUCOPHAGE) 500 MG tablet, Take 1 tablet by mouth daily (with breakfast), Disp: 90 tablet, Rfl: 1    methotrexate Sodium (RHEUMATREX) 50 MG/2ML chemo injection, INJECT 0.6 ML SUBCUTANEOUSLY ONCE EACH WEEK., Disp: , Rfl: 3    acyclovir (ZOVIRAX) 400 MG tablet, TAKE 1 TABLET BY MOUTH EVERY 12 HOURS., Disp: , Rfl: 3    folic acid (FOLVITE) 1 MG tablet, Take 1 mg by mouth, Disp: , Rfl:     sodium chloride, PF, 0.9 % injection, Inject 10 ml intravenously as directed. Flush mediport with 10 cc of 0.9% Normal Saline pre and post infusion. Rhiannon Castillo \"INFUSION PROTOCOL-DO NOT DELETE\", Disp: , Rfl:     acetaminophen (TYLENOL) 325 MG tablet, Take 650 mg by mouth, Disp: , Rfl:     inFLIXimab (REMICADE) 100 MG injection, Give initial infusion week 0, week 2, and every 4 weeks there after. Inject intravenously, give 5mg/kg IV in 250 ml Normal Saline. Infuse over 2 hours. , Disp: , Rfl:     blood glucose test strips (FREESTYLE TEST STRIPS) strip, 1 each by In Vitro route daily As needed. , Disp: 100 each, Rfl: 3    Handicap Placard MISC, by Does not apply route, Disp: 1 each, Rfl: 0    Lancets MISC, Test daily, Disp: 100 each, Rfl: 3    glucose monitoring kit (FREESTYLE) monitoring kit, 1 kit by Does not apply route daily, Disp: 1 kit, Rfl: 0    FREESTYLE LANCETS MISC, 1 each by Does not apply route daily, Disp: 100 each, Rfl: 3    acetaminophen (TYLENOL) 650 MG CR tablet, INFUSION PROTOCOL DO NOT DELETEGive 650 mg po prn for temperature above 38 degrees Celsius for one dose only. , Disp: , Rfl:   Allergies   Allergen Reactions    Hctz [Hydrochlorothiazide]      Unknown reaction    Phenytoin Sodium Extended Nausea Only    Sulfa Antibiotics      Unknown reaction          Physical Exam:  Vitals:    06/21/21 1020 06/21/21 1027   BP: (!) 150/100 (!) 140/90   Site: Left Upper Arm    Position: Sitting    Cuff Size: Large Adult    Pulse: 111    Resp: 16    Temp: 97 °F (36.1 °C)    TempSrc: Temporal    SpO2: 98%    Weight: 218 lb 3.2 oz (99 kg)    Height: 5' 3.5\" (1.613 m)      General:  Patient alert and oriented x 3, NAD, pleasant  HEENT:  Atraumatic, normocephalic, PERRLA, EOMI, clear conjunctiva, TMs clear, nose-clear, throat - no erythema  Neck:  Supple, no goiter, no carotid bruits, no LAD  Lungs:  CTA B  Heart:  RRR, no murmurs, gallops or rubs  Abdomen:  Soft, NTND, + bowel sounds  Extremities:  No clubbing, cyanosis or edema  Neuro:  CN II-XII grossly intact, 5/5 strength in bilateral upper and lower extremities, 2 + reflexes. Breast:  No skin dimpling or erythema, no discrete masses or lumps, no nipple discharge, no axillary lymphadenopathy  Female :    External exam:  Labia with no visible lesions/masses,  vagina with no abnormal  discharge, odor or lesions    Internal exam:  Cervix- pink, no lesions, no cervical motion tenderness, uterus - normal in size, ovaries - not palpable    Assessment/Plan:  [facility] was seen today for gynecologic exam, diabetes, hyperlipidemia and hypertension. Diagnoses and all orders for this visit:    Anxiety  Not on any meds for this  Tries to cope on her own  Stable, cont meds recheck 6 mos      Essential hypertension  Vitals:    06/21/21 1027   BP: (!) 140/90   Pulse:    Resp:    Temp:    SpO2:    on aldactone  Follow her bp's at home and reheck 2 mos to see about bp  Consider ace    -     spironolactone (ALDACTONE) 25 MG tablet;  Take 1 tablet by mouth daily    Type 2 diabetes mellitus without complication, without long-term current use of insulin (formerly [facility])  Lab Results   Component Value Date    LABA1C 6.0 06/21/2021     No results found for: EAG    - metFORMIN (GLUCOPHAGE) 500 MG tablet; Take 1 tablet by mouth daily (with breakfast)  -     POCT glycosylated hemoglobin (Hb A1C)    Psoriasis. cl  On mtx      Sarcoidosis  mtx  and remicaid  Stable, cont meds recheck 6 mos    Immunosuppressed status ([facility] [address]   Rheumatrix, remicade  Stable, cont meds recheck 6 mos      Hypercholesterolemia  The 10-year ASCVD risk score (Beto Beltran., et al., 2013) is: 12.3%    Values used to calculate the score:      Age: 56 years      Sex: Female      Is Non- : No      Diabetic: Yes      Tobacco smoker: No      Systolic Blood Pressure: 940 mmHg      Is BP treated: Yes      HDL Cholesterol: 72 mg/dL      Total Cholesterol: 254 mg/dL    Advised to be on statin    Vitamin D deficiency   Vit d level is 49    Severe obesity (BMI 35.0-35.9 with comorbidity) (HCC)   tring to diet and hard to exercise due to arthralgias

## 2021-06-26 LAB
HPV SAMPLE: NORMAL
HPV TYPE 16: NOT DETECTED
HPV TYPE 18: NOT DETECTED
HPV, HIGH RISK OTHER: NOT DETECTED
INTERPRETATION: NORMAL
SOURCE: NORMAL

## 2021-08-11 ENCOUNTER — HOSPITAL ENCOUNTER (OUTPATIENT)
Dept: MAMMOGRAPHY | Age: 61
Discharge: HOME | End: 2021-08-13
Payer: MEDICARE

## 2021-08-11 DIAGNOSIS — Z12.31 OTHER SCREENING MAMMOGRAM: ICD-10-CM

## 2021-08-11 PROCEDURE — 77063 BREAST TOMOSYNTHESIS BI: CPT

## 2021-12-17 DIAGNOSIS — E11.9 TYPE 2 DIABETES MELLITUS WITHOUT COMPLICATION, WITHOUT LONG-TERM CURRENT USE OF INSULIN (HCC): ICD-10-CM

## 2021-12-17 DIAGNOSIS — I10 ESSENTIAL HYPERTENSION: ICD-10-CM

## 2021-12-17 RX ORDER — SPIRONOLACTONE 25 MG/1
25 TABLET ORAL DAILY
Qty: 90 TABLET | Refills: 0 | Status: SHIPPED
Start: 2021-12-17 | End: 2022-04-12 | Stop reason: SDUPTHER

## 2021-12-17 NOTE — TELEPHONE ENCOUNTER
----- Message from [place] [facility] [place] sent at 12/17/2021 10:40 AM EST -----  Subject: Refill Request    QUESTIONS  Name of Medication? metFORMIN (GLUCOPHAGE) 500 MG tablet  Patient-reported dosage and instructions? 1x daily  How many days do you have left? 3  Preferred Pharmacy? [address]  Pharmacy phone number (if available)? [phone]  ---------------------------------------------------------------------------  --------------,  Name of Medication? spironolactone (ALDACTONE) 25 MG tablet  Patient-reported dosage and instructions? 1 x daily  How many days do you have left? 5  Preferred Pharmacy? [address]  Pharmacy phone number (if available)? [phone]  ---------------------------------------------------------------------------  --------------  CALL BACK INFO  What is the best way for the office to contact you? OK to leave message on   voicemail  Preferred Call Back Phone Number?  [phone]

## 2021-12-17 NOTE — TELEPHONE ENCOUNTER
Medication Refill Request    LOV 6/21/2021  NOV Visit date not found    Lab Results   Component Value Date    CREATININE 0.9 12/13/2016

## 2022-01-06 ENCOUNTER — PATIENT MESSAGE (OUTPATIENT)
Dept: FAMILY MEDICINE CLINIC | Age: 62
End: 2022-01-06

## 2022-01-06 NOTE — TELEPHONE ENCOUNTER
From: Catie Brewer Pay  To: Dr. Dominic Driver  Sent: 1/6/2022 10:52 AM EST  Subject: disability prescription placard    I need a renewal prescription for my disability placard.   Mail to me at  [address]  [facility]  Thank you

## 2022-04-12 DIAGNOSIS — I10 ESSENTIAL HYPERTENSION: ICD-10-CM

## 2022-04-12 RX ORDER — SPIRONOLACTONE 25 MG/1
25 TABLET ORAL DAILY
Qty: 90 TABLET | Refills: 0 | Status: SHIPPED
Start: 2022-04-12 | End: 2022-07-19 | Stop reason: SDUPTHER

## 2022-07-19 DIAGNOSIS — E11.9 TYPE 2 DIABETES MELLITUS WITHOUT COMPLICATION, WITHOUT LONG-TERM CURRENT USE OF INSULIN (HCC): ICD-10-CM

## 2022-07-19 DIAGNOSIS — I10 ESSENTIAL HYPERTENSION: ICD-10-CM

## 2022-07-20 RX ORDER — SPIRONOLACTONE 25 MG/1
25 TABLET ORAL DAILY
Qty: 90 TABLET | Refills: 0 | Status: SHIPPED
Start: 2022-07-20 | End: 2022-10-12

## 2022-07-20 NOTE — TELEPHONE ENCOUNTER
Per  pt needs an appointment.     Electronically signed by Gian Hernandez MA on 7/20/2022 at 10:05 AM

## 2022-10-12 DIAGNOSIS — I10 ESSENTIAL HYPERTENSION: ICD-10-CM

## 2022-10-12 RX ORDER — SPIRONOLACTONE 25 MG/1
TABLET ORAL
Qty: 90 TABLET | Refills: 0 | Status: SHIPPED | OUTPATIENT
Start: 2022-10-12

## 2022-10-30 DIAGNOSIS — E11.9 TYPE 2 DIABETES MELLITUS WITHOUT COMPLICATION, WITHOUT LONG-TERM CURRENT USE OF INSULIN (HCC): ICD-10-CM

## 2022-12-25 DIAGNOSIS — I10 ESSENTIAL HYPERTENSION: ICD-10-CM

## 2022-12-27 RX ORDER — SPIRONOLACTONE 25 MG/1
25 TABLET ORAL 2 TIMES DAILY
Qty: 90 TABLET | Refills: 1 | Status: SHIPPED | OUTPATIENT
Start: 2022-12-27

## 2023-01-19 ENCOUNTER — TELEPHONE (OUTPATIENT)
Dept: FAMILY MEDICINE CLINIC | Age: 63
End: 2023-01-19

## 2023-01-19 NOTE — TELEPHONE ENCOUNTER
Julius Cardona called. Said her bp has been elevated. Top number in 150's. Sable Res it has been going on for a couple months. She said she does get blurred vision sometimes and this has also been going on. She said she gets dizzy and she said that is from her sarcoidosis. Offered appt today with Dawson Spaulding. Wanted to see you. Scheduled tomorrow for 10:30 am.   Please advise if appt tomorrow is ok?

## 2023-01-20 ENCOUNTER — OFFICE VISIT (OUTPATIENT)
Dept: FAMILY MEDICINE CLINIC | Age: 63
End: 2023-01-20

## 2023-01-20 VITALS
SYSTOLIC BLOOD PRESSURE: 144 MMHG | RESPIRATION RATE: 16 BRPM | HEART RATE: 88 BPM | HEIGHT: 64 IN | OXYGEN SATURATION: 98 % | TEMPERATURE: 97.2 F | DIASTOLIC BLOOD PRESSURE: 90 MMHG | WEIGHT: 213.8 LBS | BODY MASS INDEX: 36.5 KG/M2

## 2023-01-20 DIAGNOSIS — I10 PRIMARY HYPERTENSION: ICD-10-CM

## 2023-01-20 DIAGNOSIS — D84.9 IMMUNOSUPPRESSED STATUS (HCC): ICD-10-CM

## 2023-01-20 DIAGNOSIS — E78.00 HYPERCHOLESTEROLEMIA: ICD-10-CM

## 2023-01-20 DIAGNOSIS — E11.9 TYPE 2 DIABETES MELLITUS WITHOUT COMPLICATION, WITHOUT LONG-TERM CURRENT USE OF INSULIN (HCC): Primary | ICD-10-CM

## 2023-01-20 DIAGNOSIS — D86.9 SARCOIDOSIS: ICD-10-CM

## 2023-01-20 DIAGNOSIS — E55.9 VITAMIN D DEFICIENCY: ICD-10-CM

## 2023-01-20 DIAGNOSIS — R07.89 CHEST PRESSURE: ICD-10-CM

## 2023-01-20 DIAGNOSIS — H53.8 BLURRED VISION: ICD-10-CM

## 2023-01-20 DIAGNOSIS — R53.83 OTHER FATIGUE: ICD-10-CM

## 2023-01-20 DIAGNOSIS — E66.01 SEVERE OBESITY (BMI 35.0-39.9) WITH COMORBIDITY (HCC): ICD-10-CM

## 2023-01-20 DIAGNOSIS — L40.9 PSORIASIS: ICD-10-CM

## 2023-01-20 LAB
ALBUMIN SERPL-MCNC: 4.7 G/DL (ref 3.5–5.2)
ALP BLD-CCNC: 82 U/L (ref 35–104)
ALT SERPL-CCNC: 21 U/L (ref 0–32)
ANION GAP SERPL CALCULATED.3IONS-SCNC: 14 MMOL/L (ref 7–16)
AST SERPL-CCNC: 22 U/L (ref 0–31)
BASOPHILS ABSOLUTE: 0.04 E9/L (ref 0–0.2)
BASOPHILS RELATIVE PERCENT: 0.6 % (ref 0–2)
BILIRUB SERPL-MCNC: 0.2 MG/DL (ref 0–1.2)
BUN BLDV-MCNC: 17 MG/DL (ref 6–23)
C-REACTIVE PROTEIN: 0.3 MG/DL (ref 0–0.4)
CALCIUM SERPL-MCNC: 10 MG/DL (ref 8.6–10.2)
CHLORIDE BLD-SCNC: 98 MMOL/L (ref 98–107)
CO2: 26 MMOL/L (ref 22–29)
CREAT SERPL-MCNC: 0.8 MG/DL (ref 0.5–1)
CREATININE URINE POCT: NORMAL
EOSINOPHILS ABSOLUTE: 0.01 E9/L (ref 0.05–0.5)
EOSINOPHILS RELATIVE PERCENT: 0.1 % (ref 0–6)
GFR SERPL CREATININE-BSD FRML MDRD: >60 ML/MIN/1.73
GLUCOSE BLD-MCNC: 118 MG/DL (ref 74–99)
HBA1C MFR BLD: 6.2 %
HCT VFR BLD CALC: 43.3 % (ref 34–48)
HEMOGLOBIN: 14.8 G/DL (ref 11.5–15.5)
IMMATURE GRANULOCYTES #: 0.09 E9/L
IMMATURE GRANULOCYTES %: 1.3 % (ref 0–5)
LYMPHOCYTES ABSOLUTE: 1.08 E9/L (ref 1.5–4)
LYMPHOCYTES RELATIVE PERCENT: 15.2 % (ref 20–42)
MCH RBC QN AUTO: 32.2 PG (ref 26–35)
MCHC RBC AUTO-ENTMCNC: 34.2 % (ref 32–34.5)
MCV RBC AUTO: 94.1 FL (ref 80–99.9)
MICROALBUMIN/CREAT 24H UR: NORMAL MG/G{CREAT}
MICROALBUMIN/CREAT UR-RTO: NORMAL
MONOCYTES ABSOLUTE: 0.71 E9/L (ref 0.1–0.95)
MONOCYTES RELATIVE PERCENT: 10 % (ref 2–12)
NEUTROPHILS ABSOLUTE: 5.17 E9/L (ref 1.8–7.3)
NEUTROPHILS RELATIVE PERCENT: 72.8 % (ref 43–80)
PDW BLD-RTO: 12.6 FL (ref 11.5–15)
PLATELET # BLD: 258 E9/L (ref 130–450)
PMV BLD AUTO: 10.2 FL (ref 7–12)
POTASSIUM SERPL-SCNC: 4.6 MMOL/L (ref 3.5–5)
RBC # BLD: 4.6 E12/L (ref 3.5–5.5)
SODIUM BLD-SCNC: 138 MMOL/L (ref 132–146)
TOTAL PROTEIN: 7.6 G/DL (ref 6.4–8.3)
TSH SERPL DL<=0.05 MIU/L-ACNC: 1.24 UIU/ML (ref 0.27–4.2)
WBC # BLD: 7.1 E9/L (ref 4.5–11.5)

## 2023-01-20 RX ORDER — LISINOPRIL 10 MG/1
10 TABLET ORAL DAILY
Qty: 90 TABLET | Refills: 1 | Status: SHIPPED | OUTPATIENT
Start: 2023-01-20

## 2023-01-20 SDOH — ECONOMIC STABILITY: FOOD INSECURITY: WITHIN THE PAST 12 MONTHS, THE FOOD YOU BOUGHT JUST DIDN'T LAST AND YOU DIDN'T HAVE MONEY TO GET MORE.: NEVER TRUE

## 2023-01-20 SDOH — ECONOMIC STABILITY: FOOD INSECURITY: WITHIN THE PAST 12 MONTHS, YOU WORRIED THAT YOUR FOOD WOULD RUN OUT BEFORE YOU GOT MONEY TO BUY MORE.: NEVER TRUE

## 2023-01-20 ASSESSMENT — ENCOUNTER SYMPTOMS
COUGH: 0
SHORTNESS OF BREATH: 0
WHEEZING: 0
BACK PAIN: 1
GASTROINTESTINAL NEGATIVE: 1
EYE PAIN: 0
ALLERGIC/IMMUNOLOGIC NEGATIVE: 1
CHEST TIGHTNESS: 0

## 2023-01-20 ASSESSMENT — PATIENT HEALTH QUESTIONNAIRE - PHQ9
SUM OF ALL RESPONSES TO PHQ QUESTIONS 1-9: 0
SUM OF ALL RESPONSES TO PHQ QUESTIONS 1-9: 0
SUM OF ALL RESPONSES TO PHQ9 QUESTIONS 1 & 2: 0
2. FEELING DOWN, DEPRESSED OR HOPELESS: 0
1. LITTLE INTEREST OR PLEASURE IN DOING THINGS: 0
SUM OF ALL RESPONSES TO PHQ QUESTIONS 1-9: 0
SUM OF ALL RESPONSES TO PHQ QUESTIONS 1-9: 0

## 2023-01-20 ASSESSMENT — SOCIAL DETERMINANTS OF HEALTH (SDOH): HOW HARD IS IT FOR YOU TO PAY FOR THE VERY BASICS LIKE FOOD, HOUSING, MEDICAL CARE, AND HEATING?: NOT VERY HARD

## 2023-01-20 NOTE — PROGRESS NOTES
2023    Current Outpatient Medications   Medication Sig Dispense Refill    lisinopril (PRINIVIL;ZESTRIL) 10 MG tablet Take 1 tablet by mouth daily 90 tablet 1    spironolactone (ALDACTONE) 25 MG tablet Take 1 tablet by mouth 2 times daily (Patient taking differently: Take 25 mg by mouth daily) 90 tablet 1    metFORMIN (GLUCOPHAGE) 500 MG tablet TAKE 1 TABLET BY MOUTH EVERY DAY WITH BREAKFAST (Patient taking differently: Take 500 mg by mouth daily) 90 tablet 0    Handicap Placard MISC by Does not apply route 1 year 1 each 0    methotrexate Sodium (RHEUMATREX) 50 MG/2ML chemo injection INJECT 0.6 ML SUBCUTANEOUSLY ONCE EACH WEEK. 3    acyclovir (ZOVIRAX) 400 MG tablet TAKE 1 TABLET BY MOUTH EVERY 12 HOURS. 3    blood glucose test strips (FREESTYLE TEST STRIPS) strip 1 each by In Vitro route daily As needed. 100 each 3    Handicap Placard MISC by Does not apply route 1 each 0    Lancets MISC Test daily 100 each 3    glucose monitoring kit (FREESTYLE) monitoring kit 1 kit by Does not apply route daily 1 kit 0    FREESTYLE LANCETS MISC 1 each by Does not apply route daily 954 each 3    folic acid (FOLVITE) 1 MG tablet Take 1 mg by mouth      sodium chloride, PF, 0.9 % injection Inject 10 ml intravenously as directed. Flush mediport with 10 cc of 0.9% Normal Saline pre and post infusion. Bert Billing \"INFUSION PROTOCOL-DO NOT DELETE\"      acetaminophen (TYLENOL) 325 MG tablet Take 650 mg by mouth as needed      inFLIXimab (REMICADE) 100 MG injection Give initial infusion week 0, week 2, and every 4 weeks there after. Inject intravenously, give 5mg/kg IV in 250 ml Normal Saline. Infuse over 2 hours. acetaminophen (TYLENOL) 650 MG CR tablet INFUSION PROTOCOL DO NOT DELETEGive 650 mg po prn for temperature above 38 degrees Celsius for one dose only. No current facility-administered medications for this visit.          Ambar Robert (: 1960 IS A 57 y.o. female ,Established patient, here for eval of Blurred Vision (elevated blood pressure and headaches)    Bp last 2 mos up and down to 150's  Vision changes does have sarcoid  Blurriness comes and goes  During the day  Had eye exam at [address] [place] [address]  Had some injectins didn. T follow up w ophtoo but does have appt next week  Last at  for rheumatolgist  Monthly infusions but not sure when last actual visit was  NP administers infusiond      ASSESSMENT / PLAN  Hemoglobin A1C   Date Value Ref Range Status   01/20/2023 6.2 % Final         1. Type 2 diabetes mellitus without complication, without long-term current use of insulin (HCC)  Hemoglobin A1C   Date Value Ref Range Status   01/20/2023 6.2 % Final   On glucophage  Watches diet andmonitors glucose  Stable, cont meds recheck 6 mos    - POCT glycosylated hemoglobin (Hb A1C)  - POCT Microalbumin    2. Severe obesity (BMI 35.0-39. 9) with comorbidity ([address]  Too fatigued and in pain to exercise  Wt Readings from Last 3 Encounters:   01/20/23 213 lb 12.8 oz (97 kg)   06/21/21 218 lb 3.2 oz (99 kg)   12/21/20 222 lb 3.2 oz (100.8 kg)      Stable, cont meds recheck 6 mos    3. Immunosuppressed status ([address]  On remicaid and mtx  Should be following with rheum   Needs to make f/u appt  Will check labs  Stable, cont meds recheck 6 mos    - C-Reactive Protein; Future  - Sedimentation Rate; Future    4. Primary hypertension  See below  - CBC with Auto Differential; Future  - Comprehensive Metabolic Panel; Future    5. Hypercholesterolemia      6. Vitamin D deficiency  *last vit d 2020 was 52. C.l  Stable, cont meds recheck 6 mos      7. Sarcoidosis  Remicaid per pulm  Needs to follow with pulm  Stable, cont meds recheck 6 mos  Will check labs  - C-Reactive Protein; Future  - Sedimentation Rate; Future    8. Psoriasis  On mtx for this      9. Other fatigue  Check labs  - TSH; Future    10.  Chest pressure  *normal EKG  The 10-year ASCVD risk score (Farida HOLLAND, et al., 2019) is: 15.9%    Values used to calculate the score:      Age: 57 years      Sex: Female      Is Non- : No      Diabetic: Yes      Tobacco smoker: No      Systolic Blood Pressure: 581 mmHg      Is BP treated: Yes      HDL Cholesterol: 72 mg/dL      Total Cholesterol: 254 mg/dL  Declines statin for now  - EKG 12 lead    11. Blurred vision  No obvuous that I can see   Will follow with ophtho nexr week    Vitals:    01/20/23 1040   BP: (!) 144/90   Pulse:    Resp:    Temp:    SpO2:      Elevated bp  Start on lisinopril 10 mg  Monitor 3 x's weekly and send to me          SUBJECTIVE    Review of Systems   Constitutional:  Positive for fatigue. Negative for activity change, appetite change and unexpected weight change. HENT:  Negative for congestion. Eyes:  Positive for visual disturbance. Negative for pain. Respiratory:  Negative for cough, chest tightness, shortness of breath and wheezing. Cardiovascular:  Negative for chest pain and palpitations. Gastrointestinal: Negative. Endocrine: Negative. Genitourinary: Negative. Musculoskeletal:  Positive for arthralgias, back pain, joint swelling and myalgias. Skin:  Negative for pallor, rash and wound. Allergic/Immunologic: Negative. Neurological: Negative. Negative for syncope. Hematological: Negative. Psychiatric/Behavioral: Negative. OBJECTIVE    Wt Readings from Last 3 Encounters:   01/20/23 213 lb 12.8 oz (97 kg)   06/21/21 218 lb 3.2 oz (99 kg)   12/21/20 222 lb 3.2 oz (100.8 kg)       Vitals:    01/20/23 1040   BP: (!) 144/90   Pulse:    Resp:    Temp:    SpO2:        Physical Exam  Vitals and nursing note reviewed. Constitutional:       Appearance: Normal appearance. Cardiovascular:      Rate and Rhythm: Normal rate and regular rhythm. Pulses: Normal pulses. Heart sounds: Normal heart sounds. Pulmonary:      Effort: Pulmonary effort is normal. No respiratory distress. Breath sounds: Normal breath sounds.    Abdominal:      General: Bowel sounds are normal. There is no distension. Palpations: Abdomen is soft. Tenderness: There is no abdominal tenderness. Musculoskeletal:         General: No swelling or tenderness. Cervical back: Normal range of motion. No rigidity. Skin:     General: Skin is warm and dry. Neurological:      General: No focal deficit present. Mental Status: She is alert. Psychiatric:         Mood and Affect: Mood normal.         Behavior: Behavior normal.         Thought Content: Thought content normal.         Judgment: Judgment normal.         Return in about 3 months (around 4/20/2023). An electronic signature was used to authenticate this note.     JAVIER Colon    1/20/2023

## 2023-01-21 LAB — SEDIMENTATION RATE, ERYTHROCYTE: 25 MM/HR (ref 0–20)

## 2023-03-21 DIAGNOSIS — E11.9 TYPE 2 DIABETES MELLITUS WITHOUT COMPLICATION, WITHOUT LONG-TERM CURRENT USE OF INSULIN (HCC): ICD-10-CM

## 2023-03-21 NOTE — TELEPHONE ENCOUNTER
Medication Refill Request    LOV 1/20/2023  NOV 4/24/2023    Lab Results   Component Value Date    CREATININE 0.8 01/20/2023

## 2023-04-03 ENCOUNTER — HOSPITAL ENCOUNTER (EMERGENCY)
Age: 63
Discharge: HOME | End: 2023-04-03
Attending: EMERGENCY MEDICINE
Payer: MEDICARE

## 2023-04-03 ENCOUNTER — APPOINTMENT (OUTPATIENT)
Dept: GENERAL RADIOLOGY | Age: 63
End: 2023-04-03
Payer: MEDICARE

## 2023-04-03 VITALS
BODY MASS INDEX: 35.85 KG/M2 | HEART RATE: 87 BPM | DIASTOLIC BLOOD PRESSURE: 86 MMHG | RESPIRATION RATE: 17 BRPM | HEIGHT: 64 IN | WEIGHT: 210 LBS | OXYGEN SATURATION: 100 % | TEMPERATURE: 98.2 F | SYSTOLIC BLOOD PRESSURE: 138 MMHG

## 2023-04-03 DIAGNOSIS — J06.9 UPPER RESPIRATORY TRACT INFECTION, UNSPECIFIED TYPE: Primary | ICD-10-CM

## 2023-04-03 LAB
ALBUMIN SERPL-MCNC: 4.2 G/DL (ref 3.5–5.2)
ALP SERPL-CCNC: 82 U/L (ref 35–104)
ALT SERPL-CCNC: 39 U/L (ref 0–32)
ANION GAP SERPL CALCULATED.3IONS-SCNC: 13 MMOL/L (ref 7–16)
AST SERPL-CCNC: 29 U/L (ref 0–31)
BASOPHILS # BLD: 0.04 E9/L (ref 0–0.2)
BASOPHILS NFR BLD: 0.6 % (ref 0–2)
BILIRUB SERPL-MCNC: 0.2 MG/DL (ref 0–1.2)
BUN SERPL-MCNC: 15 MG/DL (ref 6–23)
CALCIUM SERPL-MCNC: 9.3 MG/DL (ref 8.6–10.2)
CHLORIDE SERPL-SCNC: 100 MMOL/L (ref 98–107)
CO2 SERPL-SCNC: 24 MMOL/L (ref 22–29)
CREAT SERPL-MCNC: 0.8 MG/DL (ref 0.5–1)
EKG ATRIAL RATE: 74 BPM
EKG P AXIS: 22 DEGREES
EKG P-R INTERVAL: 136 MS
EKG Q-T INTERVAL: 384 MS
EKG QRS DURATION: 80 MS
EKG QTC CALCULATION (BAZETT): 426 MS
EKG R AXIS: 4 DEGREES
EKG T AXIS: 41 DEGREES
EKG VENTRICULAR RATE: 74 BPM
EOSINOPHIL # BLD: 0.03 E9/L (ref 0.05–0.5)
EOSINOPHIL NFR BLD: 0.4 % (ref 0–6)
ERYTHROCYTE [DISTWIDTH] IN BLOOD BY AUTOMATED COUNT: 12.5 FL (ref 11.5–15)
GLUCOSE SERPL-MCNC: 115 MG/DL (ref 74–99)
HCT VFR BLD AUTO: 40.6 % (ref 34–48)
HGB BLD-MCNC: 14.1 G/DL (ref 11.5–15.5)
IMM GRANULOCYTES # BLD: 0.03 E9/L
IMM GRANULOCYTES NFR BLD: 0.4 % (ref 0–5)
INFLUENZA A: NOT DETECTED
INFLUENZA B: NOT DETECTED
LYMPHOCYTES # BLD: 1.17 E9/L (ref 1.5–4)
LYMPHOCYTES NFR BLD: 17 % (ref 20–42)
MCH RBC QN AUTO: 31.8 PG (ref 26–35)
MCHC RBC AUTO-ENTMCNC: 34.7 % (ref 32–34.5)
MCV RBC AUTO: 91.4 FL (ref 80–99.9)
MONOCYTES # BLD: 0.56 E9/L (ref 0.1–0.95)
MONOCYTES NFR BLD: 8.2 % (ref 2–12)
NEUTROPHILS # BLD: 5.04 E9/L (ref 1.8–7.3)
NEUTS SEG NFR BLD: 73.4 % (ref 43–80)
PLATELET # BLD AUTO: 227 E9/L (ref 130–450)
PMV BLD AUTO: 9.4 FL (ref 7–12)
POTASSIUM SERPL-SCNC: 4.5 MMOL/L (ref 3.5–5)
PROT SERPL-MCNC: 7 G/DL (ref 6.4–8.3)
RBC # BLD AUTO: 4.44 E12/L (ref 3.5–5.5)
SARS-COV-2 RNA, RT PCR: NOT DETECTED
SODIUM SERPL-SCNC: 137 MMOL/L (ref 132–146)
TROPONIN, HIGH SENSITIVITY: <6 NG/L (ref 0–9)
WBC # BLD: 6.9 E9/L (ref 4.5–11.5)

## 2023-04-03 PROCEDURE — 85025 COMPLETE CBC W/AUTO DIFF WBC: CPT

## 2023-04-03 PROCEDURE — 80053 COMPREHEN METABOLIC PANEL: CPT

## 2023-04-03 PROCEDURE — 87636 SARSCOV2 & INF A&B AMP PRB: CPT

## 2023-04-03 PROCEDURE — 84484 ASSAY OF TROPONIN QUANT: CPT

## 2023-04-03 PROCEDURE — 36415 COLL VENOUS BLD VENIPUNCTURE: CPT

## 2023-04-03 PROCEDURE — 71045 X-RAY EXAM CHEST 1 VIEW: CPT

## 2023-04-03 PROCEDURE — 93010 ELECTROCARDIOGRAM REPORT: CPT | Performed by: INTERNAL MEDICINE

## 2023-04-03 PROCEDURE — 93005 ELECTROCARDIOGRAM TRACING: CPT | Performed by: EMERGENCY MEDICINE

## 2023-04-03 PROCEDURE — 99285 EMERGENCY DEPT VISIT HI MDM: CPT

## 2023-04-03 RX ORDER — AZITHROMYCIN 250 MG/1
TABLET, FILM COATED ORAL
Qty: 1 PACKET | Refills: 0 | Status: SHIPPED | OUTPATIENT
Start: 2023-04-03 | End: 2023-04-07

## 2023-04-03 RX ORDER — BROMPHENIRAMINE MALEATE, PSEUDOEPHEDRINE HYDROCHLORIDE, AND DEXTROMETHORPHAN HYDROBROMIDE 2; 30; 10 MG/5ML; MG/5ML; MG/5ML
5 SYRUP ORAL 4 TIMES DAILY PRN
Qty: 240 ML | Refills: 0 | Status: SHIPPED | OUTPATIENT
Start: 2023-04-03 | End: 2023-05-03

## 2023-04-03 ASSESSMENT — ENCOUNTER SYMPTOMS
COUGH: 1
SHORTNESS OF BREATH: 0
ABDOMINAL PAIN: 0

## 2023-04-03 ASSESSMENT — PAIN DESCRIPTION - DESCRIPTORS
DESCRIPTORS: ACHING;NAGGING;DISCOMFORT
DESCRIPTORS: PRESSURE

## 2023-04-03 ASSESSMENT — PAIN - FUNCTIONAL ASSESSMENT
PAIN_FUNCTIONAL_ASSESSMENT: 0-10
PAIN_FUNCTIONAL_ASSESSMENT: 0-10

## 2023-04-03 ASSESSMENT — PAIN DESCRIPTION - FREQUENCY: FREQUENCY: CONTINUOUS

## 2023-04-03 ASSESSMENT — PAIN SCALES - GENERAL
PAINLEVEL_OUTOF10: 6
PAINLEVEL_OUTOF10: 8

## 2023-04-03 ASSESSMENT — PAIN DESCRIPTION - LOCATION: LOCATION: CHEST

## 2023-04-03 ASSESSMENT — PAIN DESCRIPTION - PAIN TYPE: TYPE: ACUTE PAIN

## 2023-04-03 ASSESSMENT — PAIN DESCRIPTION - ONSET: ONSET: ON-GOING

## 2023-04-03 NOTE — ED PROVIDER NOTES
not drink alcohol and does not use drugs. Family History: family history includes Breast Cancer (age of onset: 79) in her maternal grandmother; Cancer in her mother; Heart Attack in her father; High Blood Pressure in her mother; Thyroid Disease in her mother. The patients home medications have been reviewed.     Allergies: Dilantin [phenytoin], Hctz [hydrochlorothiazide], and Sulfa antibiotics    -------------------------------------------------- RESULTS -------------------------------------------------  Labs:  Results for orders placed or performed during the hospital encounter of 04/03/23   COVID-19 & Influenza Combo    Specimen: Nasopharyngeal Swab   Result Value Ref Range    SARS-CoV-2 RNA, RT PCR NOT DETECTED NOT DETECTED    INFLUENZA A NOT DETECTED NOT DETECTED    INFLUENZA B NOT DETECTED NOT DETECTED   CMP   Result Value Ref Range    Sodium 137 132 - 146 mmol/L    Potassium 4.5 3.5 - 5.0 mmol/L    Chloride 100 98 - 107 mmol/L    CO2 24 22 - 29 mmol/L    Anion Gap 13 7 - 16 mmol/L    Glucose 115 (H) 74 - 99 mg/dL    BUN 15 6 - 23 mg/dL    Creatinine 0.8 0.5 - 1.0 mg/dL    Est, Glom Filt Rate >60 >=60 mL/min/1.73    Calcium 9.3 8.6 - 10.2 mg/dL    Total Protein 7.0 6.4 - 8.3 g/dL    Albumin 4.2 3.5 - 5.2 g/dL    Total Bilirubin 0.2 0.0 - 1.2 mg/dL    Alkaline Phosphatase 82 35 - 104 U/L    ALT 39 (H) 0 - 32 U/L    AST 29 0 - 31 U/L   CBC with Auto Differential   Result Value Ref Range    WBC 6.9 4.5 - 11.5 E9/L    RBC 4.44 3.50 - 5.50 E12/L    Hemoglobin 14.1 11.5 - 15.5 g/dL    Hematocrit 40.6 34.0 - 48.0 %    MCV 91.4 80.0 - 99.9 fL    MCH 31.8 26.0 - 35.0 pg    MCHC 34.7 (H) 32.0 - 34.5 %    RDW 12.5 11.5 - 15.0 fL    Platelets 971 671 - 060 E9/L    MPV 9.4 7.0 - 12.0 fL    Neutrophils % 73.4 43.0 - 80.0 %    Immature Granulocytes % 0.4 0.0 - 5.0 %    Lymphocytes % 17.0 (L) 20.0 - 42.0 %    Monocytes % 8.2 2.0 - 12.0 %    Eosinophils % 0.4 0.0 - 6.0 %    Basophils % 0.6 0.0 - 2.0 %    Neutrophils

## 2023-04-04 ENCOUNTER — TELEPHONE (OUTPATIENT)
Dept: FAMILY MEDICINE CLINIC | Age: 63
End: 2023-04-04

## 2023-04-04 NOTE — TELEPHONE ENCOUNTER
[facility] ([place] [facility]) ED Follow up Call    Reason for ED visit:  Upper Respiratory Infection      Hi Tayo Carreon , this is Stephanie Ramires, RN from Dr. Jennifer Virgen office, just calling to see how you are doing after your recent ED visit. Did you receive discharge instructions? Yes  Do you understand the discharge instructions? Yes  Did the ED give you any new prescriptions? Yes  Were you able to fill your prescriptions? Yes      Do you have one of our red, yellow and green  Zone sheets that help you to determine when you should go to the ED? Yes      Do you need or want to make a follow up appt with your PCP? Appt already scheduled for 4/24/23, okay to keep patient will call if she needs one sooner. Do you have any further needs in the home i.e. Equipment? No    Will continue with medications given in ER and call if symptoms not improving by end of week.        FU appts/Provider:    Future Appointments   Date Time Provider Kit Contreras   4/24/2023 10:15 AM [facility]

## 2023-04-21 SDOH — ECONOMIC STABILITY: HOUSING INSECURITY
IN THE LAST 12 MONTHS, WAS THERE A TIME WHEN YOU DID NOT HAVE A STEADY PLACE TO SLEEP OR SLEPT IN A SHELTER (INCLUDING NOW)?: PATIENT REFUSED

## 2023-04-21 SDOH — ECONOMIC STABILITY: FOOD INSECURITY: WITHIN THE PAST 12 MONTHS, YOU WORRIED THAT YOUR FOOD WOULD RUN OUT BEFORE YOU GOT MONEY TO BUY MORE.: PATIENT DECLINED

## 2023-04-21 SDOH — ECONOMIC STABILITY: TRANSPORTATION INSECURITY
IN THE PAST 12 MONTHS, HAS LACK OF TRANSPORTATION KEPT YOU FROM MEETINGS, WORK, OR FROM GETTING THINGS NEEDED FOR DAILY LIVING?: PATIENT DECLINED

## 2023-04-21 SDOH — ECONOMIC STABILITY: INCOME INSECURITY: HOW HARD IS IT FOR YOU TO PAY FOR THE VERY BASICS LIKE FOOD, HOUSING, MEDICAL CARE, AND HEATING?: PATIENT DECLINED

## 2023-04-21 SDOH — ECONOMIC STABILITY: FOOD INSECURITY: WITHIN THE PAST 12 MONTHS, THE FOOD YOU BOUGHT JUST DIDN'T LAST AND YOU DIDN'T HAVE MONEY TO GET MORE.: PATIENT DECLINED

## 2023-04-24 ENCOUNTER — OFFICE VISIT (OUTPATIENT)
Dept: FAMILY MEDICINE CLINIC | Age: 63
End: 2023-04-24
Payer: MEDICARE

## 2023-04-24 VITALS
OXYGEN SATURATION: 98 % | WEIGHT: 212.6 LBS | RESPIRATION RATE: 16 BRPM | HEART RATE: 97 BPM | DIASTOLIC BLOOD PRESSURE: 78 MMHG | SYSTOLIC BLOOD PRESSURE: 132 MMHG | BODY MASS INDEX: 36.49 KG/M2 | TEMPERATURE: 96.9 F

## 2023-04-24 DIAGNOSIS — I10 PRIMARY HYPERTENSION: ICD-10-CM

## 2023-04-24 DIAGNOSIS — D84.9 IMMUNOSUPPRESSED STATUS (HCC): ICD-10-CM

## 2023-04-24 DIAGNOSIS — D86.9 SARCOIDOSIS: ICD-10-CM

## 2023-04-24 DIAGNOSIS — Z00.00 MEDICARE ANNUAL WELLNESS VISIT, SUBSEQUENT: ICD-10-CM

## 2023-04-24 DIAGNOSIS — E11.9 TYPE 2 DIABETES MELLITUS WITHOUT COMPLICATION, WITHOUT LONG-TERM CURRENT USE OF INSULIN (HCC): Primary | ICD-10-CM

## 2023-04-24 DIAGNOSIS — E78.00 HYPERCHOLESTEROLEMIA: ICD-10-CM

## 2023-04-24 LAB — HBA1C MFR BLD: 6.3 %

## 2023-04-24 PROCEDURE — G0439 PPPS, SUBSEQ VISIT: HCPCS | Performed by: FAMILY MEDICINE

## 2023-04-24 PROCEDURE — 3078F DIAST BP <80 MM HG: CPT | Performed by: FAMILY MEDICINE

## 2023-04-24 PROCEDURE — 3044F HG A1C LEVEL LT 7.0%: CPT | Performed by: FAMILY MEDICINE

## 2023-04-24 PROCEDURE — 3074F SYST BP LT 130 MM HG: CPT | Performed by: FAMILY MEDICINE

## 2023-04-24 PROCEDURE — 3017F COLORECTAL CA SCREEN DOC REV: CPT | Performed by: FAMILY MEDICINE

## 2023-04-24 PROCEDURE — 83036 HEMOGLOBIN GLYCOSYLATED A1C: CPT | Performed by: FAMILY MEDICINE

## 2023-04-24 RX ORDER — ACETAMINOPHEN 160 MG
TABLET,DISINTEGRATING ORAL DAILY
COMMUNITY

## 2023-04-24 RX ORDER — DIPHENHYDRAMINE HCL 25 MG
25 TABLET ORAL EVERY 6 HOURS PRN
COMMUNITY

## 2023-04-24 ASSESSMENT — LIFESTYLE VARIABLES
HOW OFTEN DO YOU HAVE A DRINK CONTAINING ALCOHOL: MONTHLY OR LESS
HOW MANY STANDARD DRINKS CONTAINING ALCOHOL DO YOU HAVE ON A TYPICAL DAY: 1 OR 2

## 2023-04-24 ASSESSMENT — PATIENT HEALTH QUESTIONNAIRE - PHQ9
SUM OF ALL RESPONSES TO PHQ9 QUESTIONS 1 & 2: 1
SUM OF ALL RESPONSES TO PHQ QUESTIONS 1-9: 1
2. FEELING DOWN, DEPRESSED OR HOPELESS: 0
SUM OF ALL RESPONSES TO PHQ QUESTIONS 1-9: 1
1. LITTLE INTEREST OR PLEASURE IN DOING THINGS: 1

## 2023-04-24 NOTE — PATIENT INSTRUCTIONS
your wishes change. Living will. This form tells your family and your doctor your wishes about life support and other treatment. The form is also called a declaration. Medical power of . This form lets you name a person to make treatment decisions for you when you can't speak for yourself. This person is called a health care agent (health care proxy, health care surrogate). The form is also called a durable power of  for health care. If you do not have an advance directive, decisions about your medical care may be made by a family member, or by a doctor or a  who doesn't know you. It may help to think of an advance directive as a gift to the people who care for you. If you have one, they won't have to make tough decisions by themselves. For more information, including forms for your state, see the [address] website (www.caringinfo.org/planning/advance-directives/). Follow-up care is a key part of your treatment and safety. Be sure to make and go to all appointments, and call your doctor if you are having problems. It's also a good idea to know your test results and keep a list of the medicines you take. What should you include in an advance directive? Many states have a unique advance directive form. (It may ask you to address specific issues.) Or you might use a universal form that's approved by many states. If your form doesn't tell you what to address, it may be hard to know what to include in your advance directive. Use the questions below to help you get started. Who do you want to make decisions about your medical care if you are not able to? What life-support measures do you want if you have a serious illness that gets worse over time or can't be cured? What are you most afraid of that might happen? (Maybe you're afraid of having pain, losing your independence, or being kept alive by machines.)  Where would you prefer to die? (Your home?  A hospital? A nursing home?)  Do you

## 2023-04-24 NOTE — PROGRESS NOTES
Medicare Annual Wellness Visit    Makayla Robert is here for Medicare AWV, Diabetes (3 month check up), Hypertension, Hyperlipidemia, and Anxiety    Assessment & Plan   Type 2 diabetes mellitus without complication, without long-term current use of insulin (HCC)     Hemoglobin A1C   Date Value Ref Range Status   04/24/2023 6.3 % Final     On metformin and is sstable  Stable, cont meds recheck 6 mos  Wt Readings from Last 3 Encounters:   04/24/23 212 lb 9.6 oz (96.4 kg)   04/03/23 210 lb (95.3 kg)   01/20/23 213 lb 12.8 oz (97 kg)       -     POCT glycosylated hemoglobin (Hb A1C)  Immunosuppressed status (HCC)   On remicade and mtx  Sees physician in [address] [place] [address] for  systmic affects of sarcoid  Stable, cont meds recheck 6 mos  Follow with [facility]    Sarcoidosis  Hypercholesterolemia  Primary hypertension  Medicare annual wellness visit, subsequent    Recommendations for Preventive Services Due: see orders and patient instructions/AVS.  Recommended screening schedule for the next 5-10 years is provided to the patient in written form: see Patient Instructions/AVS.     Return in 6 months (on 10/24/2023) for Medicare Annual Wellness Visit in 1 year. Subjective       Patient's complete Health Risk Assessment and screening values have been reviewed and are found in Flowsheets. The following problems were reviewed today and where indicated follow up appointments were made and/or referrals ordered. Positive Risk Factor Screenings with Interventions:       Cognitive:    Words recalled: 2 Words Recalled           Total Score Interpretation: Abnormal Mini-Cog      Interventions:  Patient declines any further evaluation or treatment           General HRA Questions:  Select all that apply: (!) New or Increased Pain    Pain Interventions:  Sees adalberto for  further followup       Weight and Activity:  Physical Activity: Inactive    Days of Exercise per Week: 0 days    Minutes of Exercise per Session: 0 min     On average,

## 2023-06-20 DIAGNOSIS — E11.9 TYPE 2 DIABETES MELLITUS WITHOUT COMPLICATION, WITHOUT LONG-TERM CURRENT USE OF INSULIN (HCC): ICD-10-CM

## 2023-06-20 NOTE — TELEPHONE ENCOUNTER
Medication Refill Request    LOV 4/24/2023  NOV 11/1/2023    Lab Results   Component Value Date    CREATININE 0.8 04/03/2023

## 2023-07-09 DIAGNOSIS — I10 ESSENTIAL HYPERTENSION: ICD-10-CM

## 2023-07-10 RX ORDER — SPIRONOLACTONE 25 MG/1
25 TABLET ORAL 2 TIMES DAILY
Qty: 90 TABLET | Refills: 1 | Status: SHIPPED | OUTPATIENT
Start: 2023-07-10

## 2023-07-10 RX ORDER — LISINOPRIL 10 MG/1
10 TABLET ORAL DAILY
Qty: 90 TABLET | Refills: 1 | Status: SHIPPED | OUTPATIENT
Start: 2023-07-10

## 2023-09-30 DIAGNOSIS — E11.9 TYPE 2 DIABETES MELLITUS WITHOUT COMPLICATION, WITHOUT LONG-TERM CURRENT USE OF INSULIN (HCC): ICD-10-CM

## 2023-09-30 DIAGNOSIS — I10 ESSENTIAL HYPERTENSION: ICD-10-CM

## 2023-10-02 DIAGNOSIS — D86.9 SARCOIDOSIS: ICD-10-CM

## 2023-10-02 RX ORDER — SPIRONOLACTONE 25 MG/1
25 TABLET ORAL 2 TIMES DAILY
Qty: 90 TABLET | Refills: 1 | Status: SHIPPED | OUTPATIENT
Start: 2023-10-02

## 2023-10-02 NOTE — TELEPHONE ENCOUNTER
Medication Refill Request    LOV 4/24/2023  NOV 10/2/2023    Lab Results   Component Value Date    CREATININE 0.8 04/03/2023

## 2023-10-09 DIAGNOSIS — E11.9 TYPE 2 DIABETES MELLITUS WITHOUT COMPLICATION, WITHOUT LONG-TERM CURRENT USE OF INSULIN (HCC): ICD-10-CM

## 2023-10-09 DIAGNOSIS — R73.9 HIGH BLOOD SUGAR: ICD-10-CM

## 2023-10-09 RX ORDER — BLOOD SUGAR DIAGNOSTIC
1 STRIP MISCELLANEOUS DAILY
Qty: 100 EACH | Refills: 3 | Status: SHIPPED | OUTPATIENT
Start: 2023-10-09

## 2023-10-10 RX ORDER — BLOOD-GLUCOSE METER
1 KIT MISCELLANEOUS DAILY
Qty: 100 EACH | Refills: 3 | Status: SHIPPED | OUTPATIENT
Start: 2023-10-10

## 2023-11-01 ENCOUNTER — OFFICE VISIT (OUTPATIENT)
Dept: FAMILY MEDICINE CLINIC | Age: 63
End: 2023-11-01
Payer: MEDICARE

## 2023-11-01 VITALS
BODY MASS INDEX: 36.56 KG/M2 | TEMPERATURE: 96.9 F | DIASTOLIC BLOOD PRESSURE: 78 MMHG | HEART RATE: 100 BPM | RESPIRATION RATE: 16 BRPM | OXYGEN SATURATION: 98 % | SYSTOLIC BLOOD PRESSURE: 124 MMHG | WEIGHT: 213 LBS

## 2023-11-01 DIAGNOSIS — D86.9 SARCOIDOSIS: ICD-10-CM

## 2023-11-01 DIAGNOSIS — E55.9 VITAMIN D DEFICIENCY: ICD-10-CM

## 2023-11-01 DIAGNOSIS — L40.9 PSORIASIS: ICD-10-CM

## 2023-11-01 DIAGNOSIS — I10 PRIMARY HYPERTENSION: Primary | ICD-10-CM

## 2023-11-01 DIAGNOSIS — E78.00 HYPERCHOLESTEROLEMIA: ICD-10-CM

## 2023-11-01 DIAGNOSIS — E11.9 TYPE 2 DIABETES MELLITUS WITHOUT COMPLICATION, WITHOUT LONG-TERM CURRENT USE OF INSULIN (HCC): ICD-10-CM

## 2023-11-01 DIAGNOSIS — M79.7 FIBROMYALGIA: ICD-10-CM

## 2023-11-01 DIAGNOSIS — D84.9 IMMUNOSUPPRESSED STATUS (HCC): ICD-10-CM

## 2023-11-01 LAB — HBA1C MFR BLD: 6.2 %

## 2023-11-01 PROCEDURE — G8417 CALC BMI ABV UP PARAM F/U: HCPCS | Performed by: FAMILY MEDICINE

## 2023-11-01 PROCEDURE — G8427 DOCREV CUR MEDS BY ELIG CLIN: HCPCS | Performed by: FAMILY MEDICINE

## 2023-11-01 PROCEDURE — G8400 PT W/DXA NO RESULTS DOC: HCPCS | Performed by: FAMILY MEDICINE

## 2023-11-01 PROCEDURE — 1123F ACP DISCUSS/DSCN MKR DOCD: CPT | Performed by: FAMILY MEDICINE

## 2023-11-01 PROCEDURE — 3017F COLORECTAL CA SCREEN DOC REV: CPT | Performed by: FAMILY MEDICINE

## 2023-11-01 PROCEDURE — 83036 HEMOGLOBIN GLYCOSYLATED A1C: CPT | Performed by: FAMILY MEDICINE

## 2023-11-01 PROCEDURE — 3074F SYST BP LT 130 MM HG: CPT | Performed by: FAMILY MEDICINE

## 2023-11-01 PROCEDURE — 3078F DIAST BP <80 MM HG: CPT | Performed by: FAMILY MEDICINE

## 2023-11-01 PROCEDURE — 3044F HG A1C LEVEL LT 7.0%: CPT | Performed by: FAMILY MEDICINE

## 2023-11-01 PROCEDURE — 1090F PRES/ABSN URINE INCON ASSESS: CPT | Performed by: FAMILY MEDICINE

## 2023-11-01 PROCEDURE — 1036F TOBACCO NON-USER: CPT | Performed by: FAMILY MEDICINE

## 2023-11-01 PROCEDURE — 99214 OFFICE O/P EST MOD 30 MIN: CPT | Performed by: FAMILY MEDICINE

## 2023-11-01 PROCEDURE — 2022F DILAT RTA XM EVC RTNOPTHY: CPT | Performed by: FAMILY MEDICINE

## 2023-11-01 PROCEDURE — G8484 FLU IMMUNIZE NO ADMIN: HCPCS | Performed by: FAMILY MEDICINE

## 2023-11-01 ASSESSMENT — PATIENT HEALTH QUESTIONNAIRE - PHQ9
SUM OF ALL RESPONSES TO PHQ QUESTIONS 1-9: 1
SUM OF ALL RESPONSES TO PHQ QUESTIONS 1-9: 1
1. LITTLE INTEREST OR PLEASURE IN DOING THINGS: 1
SUM OF ALL RESPONSES TO PHQ QUESTIONS 1-9: 1
SUM OF ALL RESPONSES TO PHQ9 QUESTIONS 1 & 2: 1
2. FEELING DOWN, DEPRESSED OR HOPELESS: 0
SUM OF ALL RESPONSES TO PHQ QUESTIONS 1-9: 1

## 2023-11-01 NOTE — PROGRESS NOTES
11/1/2023    Current Outpatient Medications   Medication Sig Dispense Refill    metFORMIN (GLUCOPHAGE) 500 MG tablet Take 1 tablet by mouth daily (with breakfast) 90 tablet 1    spironolactone (ALDACTONE) 25 MG tablet Take 1 tablet by mouth 2 times daily 90 tablet 1    lisinopril (PRINIVIL;ZESTRIL) 10 MG tablet Take 1 tablet by mouth daily 90 tablet 1    diphenhydrAMINE (BENADRYL) 25 MG tablet Take 1 tablet by mouth every 6 hours as needed for Itching      Cholecalciferol (VITAMIN D3) 50 MCG (2000 UT) CAPS Take by mouth daily      B COMPLEX-C PO Take by mouth      methotrexate Sodium (RHEUMATREX) 50 MG/2ML chemo injection INJECT 0.6 ML SUBCUTANEOUSLY ONCE EACH WEEK. 3    acyclovir (ZOVIRAX) 400 MG tablet TAKE 1 TABLET BY MOUTH EVERY 12 HOURS. 3    folic acid (FOLVITE) 1 MG tablet Take 1 tablet by mouth      acetaminophen (TYLENOL) 325 MG tablet Take 2 tablets by mouth as needed      inFLIXimab (REMICADE) 100 MG injection Give initial infusion week 0, week 2, and every 4 weeks there after. Inject intravenously, give 5mg/kg IV in 250 ml Normal Saline. Infuse over 2 hours. blood glucose test strips (FREESTYLE LITE) strip 1 each by In Vitro route daily 100 each 3    Handicap Placard MISC by Does not apply route Expires 2 years after issue date 1 each 0    blood glucose test strips (FREESTYLE TEST STRIPS) strip 1 each by In Vitro route daily As needed. 100 each 3    Lancets MISC Test daily 100 each 3    glucose monitoring kit (FREESTYLE) monitoring kit 1 kit by Does not apply route daily 1 kit 0    FREESTYLE LANCETS MISC 1 each by Does not apply route daily 100 each 3    sodium chloride, PF, 0.9 % injection Inject 10 ml intravenously as directed. Flush mediport with 10 cc of 0.9% Normal Saline pre and post infusion. Chrissy Sanchez \"INFUSION PROTOCOL-DO NOT DELETE\"      acetaminophen (TYLENOL) 650 MG CR tablet INFUSION PROTOCOL DO NOT DELETEGive 650 mg po prn for temperature above 38 degrees Celsius for one dose only.        No

## 2023-11-02 ASSESSMENT — ENCOUNTER SYMPTOMS
CHEST TIGHTNESS: 0
COUGH: 0
EYES NEGATIVE: 1
SHORTNESS OF BREATH: 0
ALLERGIC/IMMUNOLOGIC NEGATIVE: 1
GASTROINTESTINAL NEGATIVE: 1
WHEEZING: 0

## 2023-11-09 ENCOUNTER — APPOINTMENT (OUTPATIENT)
Dept: CT IMAGING | Age: 63
End: 2023-11-09
Payer: MEDICARE

## 2023-11-09 ENCOUNTER — HOSPITAL ENCOUNTER (EMERGENCY)
Age: 63
Discharge: HOME | End: 2023-11-09
Attending: STUDENT IN AN ORGANIZED HEALTH CARE EDUCATION/TRAINING PROGRAM
Payer: MEDICARE

## 2023-11-09 ENCOUNTER — APPOINTMENT (OUTPATIENT)
Dept: GENERAL RADIOLOGY | Age: 63
End: 2023-11-09
Payer: MEDICARE

## 2023-11-09 VITALS
BODY MASS INDEX: 36.19 KG/M2 | RESPIRATION RATE: 14 BRPM | OXYGEN SATURATION: 100 % | DIASTOLIC BLOOD PRESSURE: 68 MMHG | HEIGHT: 64 IN | SYSTOLIC BLOOD PRESSURE: 130 MMHG | WEIGHT: 212 LBS | HEART RATE: 74 BPM | TEMPERATURE: 98 F

## 2023-11-09 DIAGNOSIS — R04.0 EPISTAXIS: Primary | ICD-10-CM

## 2023-11-09 LAB
ALBUMIN SERPL-MCNC: 4.9 G/DL (ref 3.5–5.2)
ALP SERPL-CCNC: 84 U/L (ref 35–104)
ALT SERPL-CCNC: 26 U/L (ref 0–32)
ANION GAP SERPL CALCULATED.3IONS-SCNC: 11 MMOL/L (ref 7–16)
AST SERPL-CCNC: 19 U/L (ref 0–31)
BASOPHILS # BLD: 0.03 K/UL (ref 0–0.2)
BASOPHILS NFR BLD: 1 % (ref 0–2)
BILIRUB SERPL-MCNC: 0.2 MG/DL (ref 0–1.2)
BUN SERPL-MCNC: 21 MG/DL (ref 6–23)
CALCIUM SERPL-MCNC: 10.8 MG/DL (ref 8.6–10.2)
CHLORIDE SERPL-SCNC: 99 MMOL/L (ref 98–107)
CO2 SERPL-SCNC: 27 MMOL/L (ref 22–29)
CREAT SERPL-MCNC: 0.9 MG/DL (ref 0.5–1)
EKG ATRIAL RATE: 80 BPM
EKG P AXIS: 20 DEGREES
EKG P-R INTERVAL: 142 MS
EKG Q-T INTERVAL: 364 MS
EKG QRS DURATION: 78 MS
EKG QTC CALCULATION (BAZETT): 419 MS
EKG R AXIS: 11 DEGREES
EKG T AXIS: 30 DEGREES
EKG VENTRICULAR RATE: 80 BPM
EOSINOPHIL # BLD: 0.03 K/UL (ref 0.05–0.5)
EOSINOPHILS RELATIVE PERCENT: 1 % (ref 0–6)
ERYTHROCYTE [DISTWIDTH] IN BLOOD BY AUTOMATED COUNT: 12.5 % (ref 11.5–15)
GFR SERPL CREATININE-BSD FRML MDRD: >60 ML/MIN/1.73M2
GLUCOSE SERPL-MCNC: 125 MG/DL (ref 74–99)
HCT VFR BLD AUTO: 42.5 % (ref 34–48)
HGB BLD-MCNC: 14.9 G/DL (ref 11.5–15.5)
IMM GRANULOCYTES # BLD AUTO: 0.03 K/UL (ref 0–0.58)
IMM GRANULOCYTES NFR BLD: 1 % (ref 0–5)
INR PPP: 1
LYMPHOCYTES NFR BLD: 1.44 K/UL (ref 1.5–4)
LYMPHOCYTES RELATIVE PERCENT: 24 % (ref 20–42)
MCH RBC QN AUTO: 32.8 PG (ref 26–35)
MCHC RBC AUTO-ENTMCNC: 35.1 G/DL (ref 32–34.5)
MCV RBC AUTO: 93.6 FL (ref 80–99.9)
MONOCYTES NFR BLD: 0.72 K/UL (ref 0.1–0.95)
MONOCYTES NFR BLD: 12 % (ref 2–12)
NEUTROPHILS NFR BLD: 62 % (ref 43–80)
NEUTS SEG NFR BLD: 3.72 K/UL (ref 1.8–7.3)
PARTIAL THROMBOPLASTIN TIME: 32.5 SEC (ref 24.5–35.1)
PLATELET # BLD AUTO: 277 K/UL (ref 130–450)
PMV BLD AUTO: 9.2 FL (ref 7–12)
POTASSIUM SERPL-SCNC: 5.1 MMOL/L (ref 3.5–5)
PROT SERPL-MCNC: 7.6 G/DL (ref 6.4–8.3)
PROTHROMBIN TIME: 11.4 SEC (ref 9.3–12.4)
RBC # BLD AUTO: 4.54 M/UL (ref 3.5–5.5)
SODIUM SERPL-SCNC: 137 MMOL/L (ref 132–146)
TROPONIN I SERPL HS-MCNC: <6 NG/L (ref 0–9)
WBC OTHER # BLD: 6 K/UL (ref 4.5–11.5)

## 2023-11-09 PROCEDURE — 70450 CT HEAD/BRAIN W/O DYE: CPT

## 2023-11-09 PROCEDURE — 84484 ASSAY OF TROPONIN QUANT: CPT

## 2023-11-09 PROCEDURE — 80053 COMPREHEN METABOLIC PANEL: CPT

## 2023-11-09 PROCEDURE — 85610 PROTHROMBIN TIME: CPT

## 2023-11-09 PROCEDURE — 85025 COMPLETE CBC W/AUTO DIFF WBC: CPT

## 2023-11-09 PROCEDURE — 99285 EMERGENCY DEPT VISIT HI MDM: CPT

## 2023-11-09 PROCEDURE — 93010 ELECTROCARDIOGRAM REPORT: CPT | Performed by: INTERNAL MEDICINE

## 2023-11-09 PROCEDURE — 85730 THROMBOPLASTIN TIME PARTIAL: CPT

## 2023-11-09 PROCEDURE — 93005 ELECTROCARDIOGRAM TRACING: CPT | Performed by: STUDENT IN AN ORGANIZED HEALTH CARE EDUCATION/TRAINING PROGRAM

## 2023-11-09 PROCEDURE — 71045 X-RAY EXAM CHEST 1 VIEW: CPT

## 2023-11-09 RX ORDER — MECLIZINE HCL 12.5 MG/1
12.5 TABLET ORAL 3 TIMES DAILY PRN
Qty: 15 TABLET | Refills: 0 | Status: SHIPPED | OUTPATIENT
Start: 2023-11-09 | End: 2023-11-19

## 2023-11-09 ASSESSMENT — ENCOUNTER SYMPTOMS
VOMITING: 0
ABDOMINAL PAIN: 0
BACK PAIN: 0
NAUSEA: 0
SHORTNESS OF BREATH: 0
COLOR CHANGE: 0
DIARRHEA: 0
COUGH: 0

## 2023-11-09 ASSESSMENT — PATIENT HEALTH QUESTIONNAIRE - PHQ9
SUM OF ALL RESPONSES TO PHQ QUESTIONS 1-9: 0
SUM OF ALL RESPONSES TO PHQ QUESTIONS 1-9: 0
1. LITTLE INTEREST OR PLEASURE IN DOING THINGS: 0
2. FEELING DOWN, DEPRESSED OR HOPELESS: 0
SUM OF ALL RESPONSES TO PHQ QUESTIONS 1-9: 0
SUM OF ALL RESPONSES TO PHQ QUESTIONS 1-9: 0
SUM OF ALL RESPONSES TO PHQ9 QUESTIONS 1 & 2: 0

## 2023-11-09 ASSESSMENT — PAIN - FUNCTIONAL ASSESSMENT
PAIN_FUNCTIONAL_ASSESSMENT: NONE - DENIES PAIN
PAIN_FUNCTIONAL_ASSESSMENT: NONE - DENIES PAIN

## 2023-11-09 ASSESSMENT — LIFESTYLE VARIABLES: HOW OFTEN DO YOU HAVE A DRINK CONTAINING ALCOHOL: MONTHLY OR LESS

## 2023-11-09 NOTE — ED PROVIDER NOTES
HPI   15-year-old female patient presenting to emergency department for concern of ongoing nosebleeds over the last 2 months. Patient stating that ever since she had fallen and hit her head 2 months prior she has been having intermittent nosebleeds. The last nosebleed was yesterday, it always comes from the right nares. They start spontaneously. She denies any known clotting disorders. She is not on any anticoagulation. She states that with pressure the nosebleed can stop in about 20 minutes. She denies known chest pain or shortness of breath. She does have a history of gait instability since 2015 she ambulates with a cane although she feels like this is now getting worse. No new chest pain or shortness of breath no numbness or weakness. Review of Systems   Constitutional:  Negative for chills and fever. HENT:  Positive for nosebleeds. Negative for congestion. Respiratory:  Negative for cough and shortness of breath. Cardiovascular:  Negative for chest pain. Gastrointestinal:  Negative for abdominal pain, diarrhea, nausea and vomiting. Genitourinary:  Negative for difficulty urinating, dysuria and hematuria. Musculoskeletal:  Negative for back pain. Skin:  Negative for color change. Neurological:  Negative for dizziness, syncope and headaches. All other systems reviewed and are negative. Physical Exam  Vitals and nursing note reviewed. Constitutional:       Appearance: Normal appearance. HENT:      Head: Normocephalic and atraumatic. Nose: Nose normal. No congestion. Mouth/Throat:      Mouth: Mucous membranes are moist.      Pharynx: Oropharynx is clear. Eyes:      Conjunctiva/sclera: Conjunctivae normal.      Pupils: Pupils are equal, round, and reactive to light. Cardiovascular:      Rate and Rhythm: Normal rate and regular rhythm. Pulses: Normal pulses. Heart sounds: Normal heart sounds.    Pulmonary:      Effort: Pulmonary effort is normal. No

## 2023-11-13 ENCOUNTER — TELEPHONE (OUTPATIENT)
Dept: FAMILY MEDICINE CLINIC | Age: 63
End: 2023-11-13

## 2023-11-13 NOTE — TELEPHONE ENCOUNTER
[facility] ([facility]) ED Follow up Call    Reason for ED visit:  Epistaxis          Hi Josefina Marco A , this is Lloyd, RN from Neela Davila's office, just calling to see how you are doing after your recent ED visit. Did you receive discharge instructions? Yes  Do you understand the discharge instructions? Yes  Did the ED give you any new prescriptions? Yes   Were you able to fill your prescriptions? Meclizine, She has not attempted to pickup yet d/t unsure about starting it. Do you have one of our red, yellow and green  Zone sheets that help you to determine when you should go to the ED? YEs      Do you need or want to make a follow up appt with your PCP? Not at this time. Do you have any further needs in the home i.e. Equipment? Concerns for starting the Meclizine, she looked it up and it is for dizziness, does not feel dizzy but is unbalanced. Referred to ENT but patient is not sure that they will be able to do anything else for her. Would like to update PCP and she what she recommends for the next step.          FU appts/Provider:    Future Appointments   Date Time Provider [address]   11/6/2024  1:00 PM Bennett Garza MD [facility]

## 2024-01-02 RX ORDER — LISINOPRIL 10 MG/1
10 TABLET ORAL DAILY
Qty: 90 TABLET | Refills: 0 | OUTPATIENT
Start: 2024-01-02

## 2024-01-02 RX ORDER — LISINOPRIL 10 MG/1
10 TABLET ORAL DAILY
Qty: 90 TABLET | Refills: 0 | Status: SHIPPED | OUTPATIENT
Start: 2024-01-02

## 2024-01-02 NOTE — TELEPHONE ENCOUNTER
Medication Refill Request    LOV 11/1/2023  NOV Visit date not found    Lab Results   Component Value Date    CREATININE 0.9 11/09/2023

## 2024-02-06 ENCOUNTER — TELEPHONE (OUTPATIENT)
Dept: FAMILY MEDICINE CLINIC | Age: 64
End: 2024-02-06

## 2024-02-06 NOTE — TELEPHONE ENCOUNTER
Pt needs a letter for jury duty for [facility] court juror # [phone] fax number [phone] please advise

## 2024-04-01 DIAGNOSIS — E11.9 TYPE 2 DIABETES MELLITUS WITHOUT COMPLICATION, WITHOUT LONG-TERM CURRENT USE OF INSULIN (HCC): ICD-10-CM

## 2024-04-01 DIAGNOSIS — I10 ESSENTIAL HYPERTENSION: ICD-10-CM

## 2024-04-01 RX ORDER — SPIRONOLACTONE 25 MG/1
25 TABLET ORAL 2 TIMES DAILY
Qty: 90 TABLET | Refills: 1 | Status: SHIPPED | OUTPATIENT
Start: 2024-04-01

## 2024-04-11 RX ORDER — LISINOPRIL 10 MG/1
10 TABLET ORAL DAILY
Qty: 90 TABLET | Refills: 0 | Status: SHIPPED | OUTPATIENT
Start: 2024-04-11

## 2024-04-17 ENCOUNTER — OFFICE VISIT (OUTPATIENT)
Dept: FAMILY MEDICINE CLINIC | Age: 64
End: 2024-04-17
Payer: COMMERCIAL

## 2024-04-17 VITALS
TEMPERATURE: 97 F | SYSTOLIC BLOOD PRESSURE: 110 MMHG | OXYGEN SATURATION: 100 % | WEIGHT: 212.4 LBS | HEART RATE: 91 BPM | DIASTOLIC BLOOD PRESSURE: 70 MMHG | BODY MASS INDEX: 36.46 KG/M2

## 2024-04-17 DIAGNOSIS — E11.9 TYPE 2 DIABETES MELLITUS WITHOUT COMPLICATION, WITHOUT LONG-TERM CURRENT USE OF INSULIN (HCC): ICD-10-CM

## 2024-04-17 DIAGNOSIS — D84.9 IMMUNOSUPPRESSED STATUS (HCC): ICD-10-CM

## 2024-04-17 DIAGNOSIS — R13.14 PHARYNGOESOPHAGEAL DYSPHAGIA: ICD-10-CM

## 2024-04-17 DIAGNOSIS — E66.01 SEVERE OBESITY (BMI 35.0-39.9) WITH COMORBIDITY (HCC): ICD-10-CM

## 2024-04-17 DIAGNOSIS — D86.9 SARCOIDOSIS: ICD-10-CM

## 2024-04-17 DIAGNOSIS — E78.00 HYPERCHOLESTEROLEMIA: ICD-10-CM

## 2024-04-17 DIAGNOSIS — L40.9 PSORIASIS: ICD-10-CM

## 2024-04-17 DIAGNOSIS — Z12.31 OTHER SCREENING MAMMOGRAM: ICD-10-CM

## 2024-04-17 DIAGNOSIS — I10 PRIMARY HYPERTENSION: Primary | ICD-10-CM

## 2024-04-17 LAB — HBA1C MFR BLD: 6.3 %

## 2024-04-17 PROCEDURE — 1036F TOBACCO NON-USER: CPT | Performed by: FAMILY MEDICINE

## 2024-04-17 PROCEDURE — 1090F PRES/ABSN URINE INCON ASSESS: CPT | Performed by: FAMILY MEDICINE

## 2024-04-17 PROCEDURE — 83036 HEMOGLOBIN GLYCOSYLATED A1C: CPT | Performed by: FAMILY MEDICINE

## 2024-04-17 PROCEDURE — 3074F SYST BP LT 130 MM HG: CPT | Performed by: FAMILY MEDICINE

## 2024-04-17 PROCEDURE — G8427 DOCREV CUR MEDS BY ELIG CLIN: HCPCS | Performed by: FAMILY MEDICINE

## 2024-04-17 PROCEDURE — 3044F HG A1C LEVEL LT 7.0%: CPT | Performed by: FAMILY MEDICINE

## 2024-04-17 PROCEDURE — 3017F COLORECTAL CA SCREEN DOC REV: CPT | Performed by: FAMILY MEDICINE

## 2024-04-17 PROCEDURE — 3078F DIAST BP <80 MM HG: CPT | Performed by: FAMILY MEDICINE

## 2024-04-17 PROCEDURE — 99214 OFFICE O/P EST MOD 30 MIN: CPT | Performed by: FAMILY MEDICINE

## 2024-04-17 PROCEDURE — 1123F ACP DISCUSS/DSCN MKR DOCD: CPT | Performed by: FAMILY MEDICINE

## 2024-04-17 PROCEDURE — 82044 UR ALBUMIN SEMIQUANTITATIVE: CPT | Performed by: FAMILY MEDICINE

## 2024-04-17 PROCEDURE — G8417 CALC BMI ABV UP PARAM F/U: HCPCS | Performed by: FAMILY MEDICINE

## 2024-04-17 PROCEDURE — G8400 PT W/DXA NO RESULTS DOC: HCPCS | Performed by: FAMILY MEDICINE

## 2024-04-17 PROCEDURE — 2022F DILAT RTA XM EVC RTNOPTHY: CPT | Performed by: FAMILY MEDICINE

## 2024-04-17 RX ORDER — OMEPRAZOLE 40 MG/1
40 CAPSULE, DELAYED RELEASE ORAL
Qty: 90 CAPSULE | Refills: 1 | Status: SHIPPED | OUTPATIENT
Start: 2024-04-17

## 2024-04-17 ASSESSMENT — PATIENT HEALTH QUESTIONNAIRE - PHQ9
SUM OF ALL RESPONSES TO PHQ9 QUESTIONS 1 & 2: 2
SUM OF ALL RESPONSES TO PHQ QUESTIONS 1-9: 2
SUM OF ALL RESPONSES TO PHQ QUESTIONS 1-9: 2
2. FEELING DOWN, DEPRESSED OR HOPELESS: SEVERAL DAYS
1. LITTLE INTEREST OR PLEASURE IN DOING THINGS: SEVERAL DAYS
2. FEELING DOWN, DEPRESSED OR HOPELESS: SEVERAL DAYS
1. LITTLE INTEREST OR PLEASURE IN DOING THINGS: SEVERAL DAYS
SUM OF ALL RESPONSES TO PHQ QUESTIONS 1-9: 2
SUM OF ALL RESPONSES TO PHQ QUESTIONS 1-9: 2
SUM OF ALL RESPONSES TO PHQ9 QUESTIONS 1 & 2: 2

## 2024-04-17 NOTE — PROGRESS NOTES
facility-administered medications for this visit.         Luh Robert (: 1960 IS A 63 y.o. female ,Established patient, here for eval of Gastroesophageal Reflux and Choking (Patient Can't swallow, pains in upper stomach says been going on for a couple of months)    Started in the past 6 mos and difficulty swallowing  Pain with eating  Has hx of sarcoid  Immunocompromised on remicade and rheumatrex    ASSESSMENT / PLAN      1. Immunosuppressed status (HCC)  *on remicade and mtx  Stable, cont meds recheck 6 mos      2. Type 2 diabetes mellitus without complication, without long-term current use of insulin (HCC)  Hemoglobin A1C   Date Value Ref Range Status   2024 6.3 % Final     Onglucophage  Stable, cont meds recheck 6 mos    - POCT microalbumin  - POCT glycosylated hemoglobin (Hb A1C)    3. Primary hypertension  Vitals:    24 1329   BP: 110/70   Pulse: 91   Temp: 97 °F (36.1 °C)   SpO2: 100%     On lisinopril  Stable, cont meds recheck 6 mos      4. Severe obesity (BMI 35.0-39.9) with comorbidity (HCC)  Wt Readings from Last 3 Encounters:   24 96.2 kg (212 lb)   24 96.3 kg (212 lb 6.4 oz)   23 96.2 kg (212 lb)     Encourage diet and exercise    5. Hypercholesterolemia  The ASCVD Risk score (Farida DK, et al., 2019) failed to calculate for the following reasons:    Cannot find a previous HDL lab    Cannot find a previous total cholesterol lab  CHOL 254 -- Final result    20 0000 TRIG 112 -- Final result   20 0000 HDL 72 Important  Abnormal Final result   20 0000 LDLCALC 160 -- Final res   Last checked in   No medication    6. Sarcoidosis  See rheum and on remacade and mtx  Some esophageal discomfort  Stable, cont meds recheck 6 mos      7. Psoriasis  On rheumand remacade    8. Other screening mammogram    - [facility] THIAGO DIGITAL SCREEN BILATERAL PER PROTOCOL; Future    9. Pharyngoesophageal dysphagia  Refer to surgery for scope  - [facility] - Fuad Handy DO,

## 2024-04-19 ENCOUNTER — OFFICE VISIT (OUTPATIENT)
Dept: SURGERY | Age: 64
End: 2024-04-19
Payer: COMMERCIAL

## 2024-04-19 ENCOUNTER — TELEPHONE (OUTPATIENT)
Dept: FAMILY MEDICINE CLINIC | Age: 64
End: 2024-04-19

## 2024-04-19 VITALS
TEMPERATURE: 97.2 F | DIASTOLIC BLOOD PRESSURE: 91 MMHG | HEART RATE: 90 BPM | SYSTOLIC BLOOD PRESSURE: 146 MMHG | BODY MASS INDEX: 36.19 KG/M2 | OXYGEN SATURATION: 100 % | HEIGHT: 64 IN | WEIGHT: 212 LBS | RESPIRATION RATE: 18 BRPM

## 2024-04-19 DIAGNOSIS — R13.19 ESOPHAGEAL DYSPHAGIA: Primary | ICD-10-CM

## 2024-04-19 DIAGNOSIS — K59.00 CONSTIPATION, UNSPECIFIED CONSTIPATION TYPE: ICD-10-CM

## 2024-04-19 DIAGNOSIS — R13.14 PHARYNGOESOPHAGEAL DYSPHAGIA: Primary | ICD-10-CM

## 2024-04-19 LAB
CREATININE URINE POCT: NORMAL
MICROALBUMIN/CREAT 24H UR: NORMAL MG/G{CREAT}
MICROALBUMIN/CREAT UR-RTO: NORMAL

## 2024-04-19 PROCEDURE — 1123F ACP DISCUSS/DSCN MKR DOCD: CPT | Performed by: SURGERY

## 2024-04-19 PROCEDURE — 1090F PRES/ABSN URINE INCON ASSESS: CPT | Performed by: SURGERY

## 2024-04-19 PROCEDURE — G8427 DOCREV CUR MEDS BY ELIG CLIN: HCPCS | Performed by: SURGERY

## 2024-04-19 PROCEDURE — 3080F DIAST BP >= 90 MM HG: CPT | Performed by: SURGERY

## 2024-04-19 PROCEDURE — 3077F SYST BP >= 140 MM HG: CPT | Performed by: SURGERY

## 2024-04-19 PROCEDURE — G8417 CALC BMI ABV UP PARAM F/U: HCPCS | Performed by: SURGERY

## 2024-04-19 PROCEDURE — 99204 OFFICE O/P NEW MOD 45 MIN: CPT | Performed by: SURGERY

## 2024-04-19 PROCEDURE — 3017F COLORECTAL CA SCREEN DOC REV: CPT | Performed by: SURGERY

## 2024-04-19 PROCEDURE — G8400 PT W/DXA NO RESULTS DOC: HCPCS | Performed by: SURGERY

## 2024-04-19 PROCEDURE — 1036F TOBACCO NON-USER: CPT | Performed by: SURGERY

## 2024-04-19 RX ORDER — SODIUM CHLORIDE 0.9 % (FLUSH) 0.9 %
5-40 SYRINGE (ML) INJECTION EVERY 12 HOURS SCHEDULED
OUTPATIENT
Start: 2024-04-19

## 2024-04-19 RX ORDER — SODIUM CHLORIDE 0.9 % (FLUSH) 0.9 %
5-40 SYRINGE (ML) INJECTION PRN
OUTPATIENT
Start: 2024-04-19

## 2024-04-19 RX ORDER — SODIUM CHLORIDE 9 MG/ML
25 INJECTION, SOLUTION INTRAVENOUS PRN
OUTPATIENT
Start: 2024-04-19

## 2024-04-19 NOTE — PROGRESS NOTES
Unknown (4/21/2023)    Housing Stability Vital Sign     Unstable Housing in the Last Year: Patient refused       Family History   Problem Relation Age of Onset    Cancer Mother     High Blood Pressure Mother     Thyroid Disease Mother     Heart Attack Father     Breast Cancer Maternal Grandmother 70       Review of Systems   All other systems reviewed and are negative.              Objective:  Vitals:    04/19/24 0759   BP: (!) 146/91   Pulse: 90   Resp: 18   Temp: 97.2 °F (36.2 °C)   TempSrc: Infrared   SpO2: 100%   Weight: 96.2 kg (212 lb)   Height: 1.626 m (5' 4\")          Physical Exam  Vitals reviewed.   Constitutional:       General: She is not in acute distress.     Appearance: Normal appearance.   HENT:      Head: Normocephalic and atraumatic.   Eyes:      Extraocular Movements: Extraocular movements intact.      Pupils: Pupils are equal, round, and reactive to light.   Cardiovascular:      Rate and Rhythm: Normal rate and regular rhythm.      Pulses: Normal pulses.      Heart sounds: Normal heart sounds.   Pulmonary:      Effort: Pulmonary effort is normal. No respiratory distress.      Breath sounds: Normal breath sounds.   Abdominal:      General: There is no distension.      Palpations: Abdomen is soft.      Tenderness: There is no abdominal tenderness.   Musculoskeletal:         General: Normal range of motion.      Cervical back: Normal range of motion.   Skin:     General: Skin is warm and dry.   Neurological:      General: No focal deficit present.      Mental Status: She is alert and oriented to person, place, and time.   Psychiatric:         Mood and Affect: Mood normal.         Behavior: Behavior normal.         CBC:   Lab Results   Component Value Date/Time    WBC 6.0 11/09/2023 10:30 AM    RBC 4.54 11/09/2023 10:30 AM    HGB 14.9 11/09/2023 10:30 AM    HCT 42.5 11/09/2023 10:30 AM    MCV 93.6 11/09/2023 10:30 AM    MCH 32.8 11/09/2023 10:30 AM    MCHC 35.1 11/09/2023 10:30 AM    RDW 12.5

## 2024-04-19 NOTE — TELEPHONE ENCOUNTER
Patient called today. She is having some difficulty filling her Prilosec due to a possible interaction with the methotrexate. Please advise if OK for her to take the Prilosec.

## 2024-04-19 NOTE — H&P (VIEW-ONLY)
[facility] Surgery Clinic Note    Assessment/Plan:      Diagnosis Orders   1. Esophageal dysphagia  FL ESOPHAGRAM    Initiate PAT Protocol    Diet NPO Exceptions are: Sips of Water with Meds    Verify informed consent    Verify pre-procedure history and physical completed    Procedure Consent    sodium chloride flush 0.9 % injection 5-40 mL    sodium chloride flush 0.9 % injection 5-40 mL    0.9 % sodium chloride infusion    Full Code      2. Constipation, unspecified constipation type  Initiate PAT Protocol    Diet NPO Exceptions are: Sips of Water with Meds    Verify informed consent    Verify pre-procedure history and physical completed    Procedure Consent    sodium chloride flush 0.9 % injection 5-40 mL    sodium chloride flush 0.9 % injection 5-40 mL    0.9 % sodium chloride infusion    Full Code            Will get an esophagram to evaluate the functionality of her swallowing, especially in the setting of sarcoidosis    Will also schedule her for an EGD with possible dilation  At the same time will perform a diagnostic colonoscopy for constipation      Chief Complaint   Patient presents with    Dysphagia    Constipation       PCP: Neela Benites MD    HPI: Luh Robert is a 63 y.o. female who presents in consultation for pharyngoesophageal dysphagia. Has a history of GERD, has not been on PPI. Has had progressive dysphagia to mainly solids. Also complains of chronic constipation. Drinks plenty of water.     Past Medical History:   Diagnosis Date    Abdominal pain     Anxiety     Diplopia     not currently    Fibromyalgia     Heartburn     HTN (hypertension)     PMR (polymyalgia rheumatica) (HCC)     Psoriasis     Shortness of breath     with 'chest cold'       Past Surgical History:   Procedure Laterality Date    BLADDER SUSPENSION      BREAST BIOPSY      COLONOSCOPY  7/14/15    ENDOSCOPY, COLON, DIAGNOSTIC  7/14/15    TUBAL LIGATION      WISDOM TOOTH EXTRACTION  1980       Prior to Admission medications

## 2024-04-22 ENCOUNTER — TELEPHONE (OUTPATIENT)
Dept: FAMILY MEDICINE CLINIC | Age: 64
End: 2024-04-22

## 2024-04-22 NOTE — TELEPHONE ENCOUNTER
The patient returned the call.  She was wanting Dr. Benites to know that there is a potential problem with the Prilosec recently prescribed. When she went to  the Prilosec, the pharmacist saw that she was taking Methotrexate.  He indicated to her that there is some kind of an interaction between the Prilosec and the Methotrexate which causes an increase in Methotrexate levels.  She did not  the Prilosec yet because of this concern.  She is asking if the Prilosec should be lowered or what to do?  Please advise.    Also, she said her upper scope is scheduled, and she saw Dr. Handy already.

## 2024-04-22 NOTE — TELEPHONE ENCOUNTER
The patient called and left a message requesting a return call.  She left no other information.  I tried to call back but had to leave a message for the patient to return the call here.

## 2024-04-23 ENCOUNTER — TELEPHONE (OUTPATIENT)
Dept: SURGERY | Age: 64
End: 2024-04-23

## 2024-04-23 RX ORDER — FAMOTIDINE 20 MG/1
20 TABLET, FILM COATED ORAL 2 TIMES DAILY
Qty: 60 TABLET | Refills: 5 | Status: SHIPPED | OUTPATIENT
Start: 2024-04-23

## 2024-04-23 NOTE — TELEPHONE ENCOUNTER
Prior Authorization Form:      DEMOGRAPHICS:                     Patient Name:  Luh Peralta  Patient :  1960            Insurance:  Payor: [employer] / Plan: [employer] ACCESS / Product Type: *No Product type* /   Insurance ID Number:    Payer/Plan Subscr  Sex Relation Sub. Ins. ID Effective Group Num   1. [employer]* LANNY PERALTA 3/21/1959 Male Spouse [id number] [id number]                                   [address] [address]   2. MEDICARE - ME* LUH PERALTA 1960 Female Self [id number]                                    [address] [address]         DIAGNOSIS & PROCEDURE:                       Procedure/Operation: EGD and colonoscopy           CPT Code: 87109 and 43584    Diagnosis:  dysphagia and constipation     ICD10 Code: R13.10 and K59.00    Location:  [place]     Surgeon:  Dr. Handy     SCHEDULING INFORMATION:                          Date: 24    Time: 12:30pm              Anesthesia:  MAC/TIVA                                                       Status:  Outpatient        Special Comments:  N/A       Electronically signed by Kaylee Garcia on 2024 at 1:34 PM

## 2024-04-23 NOTE — TELEPHONE ENCOUNTER
Scheduled patient for EGD with possible dilation and colonoscopy on 5/6/24 at 12:30pm in [facility] . Patient needs to report at the front entrance 1 hour prior to the procedure, no ASA products for 7 days, remind patient to do the colon prep as well as 2 day clear liquid diet. Esophagram is scheduled on 5/24/24 at 9:00am, arrival time is at 8:30am in [facility]. Patient verbalized understanding. Instruction letter mailed. Encouraged patient to call our office if any questions.     Electronically signed by Kaylee Garcia on 4/23/2024 at 1:37 PM

## 2024-04-26 ENCOUNTER — HOSPITAL ENCOUNTER (OUTPATIENT)
Dept: MAMMOGRAPHY | Age: 64
Discharge: HOME | End: 2024-04-26
Payer: COMMERCIAL

## 2024-04-26 VITALS — WEIGHT: 212 LBS | HEIGHT: 64 IN | BODY MASS INDEX: 36.19 KG/M2

## 2024-04-26 DIAGNOSIS — Z12.31 OTHER SCREENING MAMMOGRAM: ICD-10-CM

## 2024-04-26 PROCEDURE — 77063 BREAST TOMOSYNTHESIS BI: CPT

## 2024-05-02 NOTE — PROGRESS NOTES
[facility] PRE-ADMISSION TESTING INSTRUCTIONS    The Preadmission Testing patient is instructed accordingly using the following criteria (check applicable):    ARRIVAL INSTRUCTIONS:  [x] Parking the day of Surgery is located in the Main Entrance lot.  Upon entering the door, make an immediate right to the surgery reception desk    [x] Bring photo ID and insurance card    [] Bring in a copy of Living will or Durable Power of  papers.    [x] Please be sure to arrange for a responsible adult to provide transportation to and from the hospital    [x] Please arrange for a responsible adult to be with you for the 24 hour period post procedure due to having anesthesia    [x] If you awake am of surgery not feeling well or have temperature >100 please call [phone]    GENERAL INSTRUCTIONS:    [x] , may have up to 8oz of water until 4 hours prior to surgery. No gum, no candy, no mints. NPO time:0830       [x] You may brush your teeth, do not swallow any toothpaste    [x] Take medications as instructed     [x] Stop herbal supplements and vitamins 5 days prior to procedure    [x] Follow preop dosing of blood thinners per physician instructions    [] Take 1/2 dose of evening insulin, but no insulin after midnight    [x] No oral diabetic medications after midnight    [x] If diabetic and have low blood sugar or feel symptomatic, take 1-2oz apple juice only    [] Bring inhalers day of surgery    [] Bring urine specimen day of surgery    [x] Shower or bath with soap, lather and rinse well, AM of Surgery, no lotion, powders or creams to surgical site    [x] Follow bowel prep as instructed per surgeon    [x] No tobacco products within 24 hours of surgery     [x] No alcohol or illegal drug use, marijuana included, within 24 hours of surgery.    [x] Jewelry, body piercing's, eyeglasses, contact lenses and dentures are not permitted into surgery (bring cases)      [x] Please do not wear any nail

## 2024-05-06 ENCOUNTER — ANESTHESIA EVENT (OUTPATIENT)
Dept: ENDOSCOPY | Age: 64
End: 2024-05-06
Payer: COMMERCIAL

## 2024-05-06 ENCOUNTER — HOSPITAL ENCOUNTER (OUTPATIENT)
Age: 64
Setting detail: OUTPATIENT SURGERY
Discharge: HOME | End: 2024-05-06
Attending: SURGERY | Admitting: SURGERY
Payer: COMMERCIAL

## 2024-05-06 ENCOUNTER — ANESTHESIA (OUTPATIENT)
Dept: ENDOSCOPY | Age: 64
End: 2024-05-06
Payer: COMMERCIAL

## 2024-05-06 VITALS
HEIGHT: 64 IN | BODY MASS INDEX: 36.19 KG/M2 | SYSTOLIC BLOOD PRESSURE: 138 MMHG | HEART RATE: 74 BPM | DIASTOLIC BLOOD PRESSURE: 68 MMHG | WEIGHT: 212 LBS | RESPIRATION RATE: 18 BRPM | OXYGEN SATURATION: 98 %

## 2024-05-06 DIAGNOSIS — R13.10 DYSPHAGIA, UNSPECIFIED TYPE: ICD-10-CM

## 2024-05-06 DIAGNOSIS — R13.19 ESOPHAGEAL DYSPHAGIA: ICD-10-CM

## 2024-05-06 DIAGNOSIS — K59.00 CONSTIPATION, UNSPECIFIED CONSTIPATION TYPE: ICD-10-CM

## 2024-05-06 LAB — GLUCOSE BLD-MCNC: 95 MG/DL (ref 74–99)

## 2024-05-06 PROCEDURE — 7100000010 HC PHASE II RECOVERY - FIRST 15 MIN: Performed by: SURGERY

## 2024-05-06 PROCEDURE — 3700000000 HC ANESTHESIA ATTENDED CARE: Performed by: SURGERY

## 2024-05-06 PROCEDURE — 3609017700 HC EGD DILATION GASTRIC/DUODENAL STRICTURE: Performed by: SURGERY

## 2024-05-06 PROCEDURE — 2709999900 HC NON-CHARGEABLE SUPPLY: Performed by: SURGERY

## 2024-05-06 PROCEDURE — 6360000002 HC RX W HCPCS: Performed by: NURSE ANESTHETIST, CERTIFIED REGISTERED

## 2024-05-06 PROCEDURE — 88305 TISSUE EXAM BY PATHOLOGIST: CPT

## 2024-05-06 PROCEDURE — 3609010300 HC COLONOSCOPY W/BIOPSY SINGLE/MULTIPLE: Performed by: SURGERY

## 2024-05-06 PROCEDURE — 7100000011 HC PHASE II RECOVERY - ADDTL 15 MIN: Performed by: SURGERY

## 2024-05-06 PROCEDURE — 82962 GLUCOSE BLOOD TEST: CPT

## 2024-05-06 PROCEDURE — 2580000003 HC RX 258: Performed by: SURGERY

## 2024-05-06 PROCEDURE — 2500000003 HC RX 250 WO HCPCS: Performed by: NURSE ANESTHETIST, CERTIFIED REGISTERED

## 2024-05-06 PROCEDURE — 3700000001 HC ADD 15 MINUTES (ANESTHESIA): Performed by: SURGERY

## 2024-05-06 RX ORDER — SODIUM CHLORIDE 0.9 % (FLUSH) 0.9 %
5-40 SYRINGE (ML) INJECTION PRN
Status: DISCONTINUED | OUTPATIENT
Start: 2024-05-06 | End: 2024-05-06 | Stop reason: HOSPADM

## 2024-05-06 RX ORDER — SODIUM CHLORIDE 0.9 % (FLUSH) 0.9 %
5-40 SYRINGE (ML) INJECTION EVERY 12 HOURS SCHEDULED
Status: DISCONTINUED | OUTPATIENT
Start: 2024-05-06 | End: 2024-05-06 | Stop reason: HOSPADM

## 2024-05-06 RX ORDER — SODIUM CHLORIDE 9 MG/ML
25 INJECTION, SOLUTION INTRAVENOUS PRN
Status: DISCONTINUED | OUTPATIENT
Start: 2024-05-06 | End: 2024-05-06 | Stop reason: HOSPADM

## 2024-05-06 RX ORDER — GLYCOPYRROLATE 0.2 MG/ML
INJECTION INTRAMUSCULAR; INTRAVENOUS PRN
Status: DISCONTINUED | OUTPATIENT
Start: 2024-05-06 | End: 2024-05-06 | Stop reason: SDUPTHER

## 2024-05-06 RX ORDER — LIDOCAINE HYDROCHLORIDE 20 MG/ML
INJECTION, SOLUTION EPIDURAL; INFILTRATION; INTRACAUDAL; PERINEURAL PRN
Status: DISCONTINUED | OUTPATIENT
Start: 2024-05-06 | End: 2024-05-06 | Stop reason: SDUPTHER

## 2024-05-06 RX ORDER — PROPOFOL 10 MG/ML
INJECTION, EMULSION INTRAVENOUS PRN
Status: DISCONTINUED | OUTPATIENT
Start: 2024-05-06 | End: 2024-05-06 | Stop reason: SDUPTHER

## 2024-05-06 RX ADMIN — GLYCOPYRROLATE 0.2 MG: 0.2 INJECTION, SOLUTION INTRAMUSCULAR; INTRAVENOUS at 12:46

## 2024-05-06 RX ADMIN — PROPOFOL 450 MG: 10 INJECTION, EMULSION INTRAVENOUS at 12:51

## 2024-05-06 RX ADMIN — SODIUM CHLORIDE: 9 INJECTION, SOLUTION INTRAVENOUS at 12:43

## 2024-05-06 RX ADMIN — LIDOCAINE HYDROCHLORIDE 100 MG: 20 INJECTION, SOLUTION EPIDURAL; INFILTRATION; INTRACAUDAL; PERINEURAL at 12:46

## 2024-05-06 ASSESSMENT — ENCOUNTER SYMPTOMS: SHORTNESS OF BREATH: 1

## 2024-05-06 ASSESSMENT — PAIN - FUNCTIONAL ASSESSMENT: PAIN_FUNCTIONAL_ASSESSMENT: 0-10

## 2024-05-06 NOTE — ANESTHESIA POSTPROCEDURE EVALUATION
Department of Anesthesiology  Postprocedure Note    Patient: Luh Robert  MRN: [id number]  YOB: 1960  Date of evaluation: 5/6/2024    Procedure Summary       Date: 05/06/24 Room / Location: [place] [address] / [facility]    Anesthesia Start: 1243 Anesthesia Stop: 1324    Procedures:       ESOPHAGOGASTRODUODENOSCOPY DILATION      ESOPHAGOGASTRODUODENOSCOPY BIOPSY      COLONOSCOPY BIOPSY Diagnosis:       Constipation, unspecified constipation type      Dysphagia, unspecified type      (Constipation, unspecified constipation type [K59.00])      (Dysphagia, unspecified type [R13.10])    Surgeons: Fuad Handy DO Responsible Provider: Reji Chua MD    Anesthesia Type: MAC ASA Status: 3            Anesthesia Type: No value filed.    Lexus Phase I:      Lexus Phase II:      Anesthesia Post Evaluation    Patient location during evaluation: bedside  Patient participation: complete - patient participated  Level of consciousness: awake and alert  Airway patency: patent  Nausea & Vomiting: no nausea and no vomiting  Cardiovascular status: blood pressure returned to baseline  Respiratory status: acceptable  Hydration status: euvolemic  Pain management: adequate        No notable events documented.

## 2024-05-06 NOTE — INTERVAL H&P NOTE
Update History & Physical    The patient's History and Physical of April 19, 2024 was reviewed with the patient and I examined the patient. There was no change. The surgical site was confirmed by the patient and me.     Plan: The risks, benefits, expected outcome, and alternative to the recommended procedure have been discussed with the patient. Patient understands and wants to proceed with the procedure.     Electronically signed by Fuad Handy DO on 5/6/2024 at 12:16 PM

## 2024-05-06 NOTE — ANESTHESIA PRE PROCEDURE
arthritis:..                  ROS comment: sarcoidosis Abdominal:   (+) obese          Vascular: negative vascular ROS.         Other Findings:             Anesthesia Plan      MAC     ASA 3       Induction: intravenous.      Anesthetic plan and risks discussed with patient.                        ZAFAR Hernandez - CRNA   5/6/2024

## 2024-05-06 NOTE — DISCHARGE INSTRUCTIONS
[facility] Colonoscopy PROCEDURE DISCHARGE INSTRUCTIONS  You may be drowsy or lightheaded after receiving sedation or anesthesia.    A responsible person should be with you for the next 24 hours.    Please follow the instructions checked below:    DIET INSTRUCTIONS:  [x]Start with light diet and progress to your normal diet as you feel like eating. If you experience nausea or repeated episodes of vomiting which persist beyond 12-24 hours, notify your doctor.  []Other     ACTIVITY INSTRUCTIONS:  [x]Rest today. Increase activity as tolerated    []No heavy lifting or strenuous activity     [x]No driving for today  []Other      MEDICATION INSTRUCTIONS:    []Prescriptions sent with you.  Use as directed.  When taking pain medications, you may experience dizziness or drowsiness.  Do not drink alcohol or drive when taking these medications.  [x]Continue preop medications                               Post-procedure Care   If any tissue was removed:   It will be sent to a lab to be examined. It may take 1-2 weeks for results. The doctor will usually give an initial report after the scope is removed. Other tests may be recommended.   A small amount of bleeding may occur during the first few days after the procedure.     When you return home after the procedure, be sure to follow your doctor's instructions, which may include:   Resume medicines as instructed by your doctor.   Resume normal diet, unless directed otherwise by your doctor.   The sedative will make you drowsy. Avoid driving, operating machinery, or making important decisions for the rest of the day.   Rest for the remainder of the day.     After arriving home, contact your doctor if any of the following occurs:   Bleeding from your rectum, notify your doctor if you pass a teaspoonful of blood or more.   Black, tarry stools   Severe abdominal pain   Hard, swollen abdomen   Signs of infection, including fever or chills   Inability to pass gas or

## 2024-05-09 LAB — SURGICAL PATHOLOGY REPORT: NORMAL

## 2024-05-24 ENCOUNTER — HOSPITAL ENCOUNTER (OUTPATIENT)
Dept: GENERAL RADIOLOGY | Age: 64
Discharge: HOME | End: 2024-05-24
Attending: SURGERY
Payer: COMMERCIAL

## 2024-05-24 DIAGNOSIS — R13.19 ESOPHAGEAL DYSPHAGIA: ICD-10-CM

## 2024-05-24 PROCEDURE — 74220 X-RAY XM ESOPHAGUS 1CNTRST: CPT

## 2024-05-24 PROCEDURE — 2500000003 HC RX 250 WO HCPCS: Performed by: RADIOLOGY

## 2024-05-24 PROCEDURE — 6370000000 HC RX 637 (ALT 250 FOR IP): Performed by: RADIOLOGY

## 2024-05-24 RX ADMIN — ANTACID/ANTIFLATULENT 1 EACH: 380; 550; 10; 10 GRANULE, EFFERVESCENT ORAL at 09:32

## 2024-05-24 RX ADMIN — BARIUM SULFATE 140 ML: 980 POWDER, FOR SUSPENSION ORAL at 09:32

## 2024-05-24 RX ADMIN — BARIUM SULFATE 1 TABLET: 700 TABLET ORAL at 09:31

## 2024-05-24 RX ADMIN — BARIUM SULFATE 176 G: 960 POWDER, FOR SUSPENSION ORAL at 09:32

## 2024-06-05 ENCOUNTER — OFFICE VISIT (OUTPATIENT)
Dept: SURGERY | Age: 64
End: 2024-06-05
Payer: COMMERCIAL

## 2024-06-05 VITALS
HEART RATE: 92 BPM | OXYGEN SATURATION: 98 % | RESPIRATION RATE: 20 BRPM | HEIGHT: 64 IN | BODY MASS INDEX: 36.39 KG/M2 | TEMPERATURE: 97.7 F | DIASTOLIC BLOOD PRESSURE: 86 MMHG | SYSTOLIC BLOOD PRESSURE: 139 MMHG

## 2024-06-05 DIAGNOSIS — R13.19 ESOPHAGEAL DYSPHAGIA: Primary | ICD-10-CM

## 2024-06-05 PROCEDURE — 1123F ACP DISCUSS/DSCN MKR DOCD: CPT | Performed by: SURGERY

## 2024-06-05 PROCEDURE — G8427 DOCREV CUR MEDS BY ELIG CLIN: HCPCS | Performed by: SURGERY

## 2024-06-05 PROCEDURE — 1036F TOBACCO NON-USER: CPT | Performed by: SURGERY

## 2024-06-05 PROCEDURE — G8417 CALC BMI ABV UP PARAM F/U: HCPCS | Performed by: SURGERY

## 2024-06-05 PROCEDURE — 1090F PRES/ABSN URINE INCON ASSESS: CPT | Performed by: SURGERY

## 2024-06-05 PROCEDURE — G8400 PT W/DXA NO RESULTS DOC: HCPCS | Performed by: SURGERY

## 2024-06-05 PROCEDURE — 99213 OFFICE O/P EST LOW 20 MIN: CPT | Performed by: SURGERY

## 2024-06-05 PROCEDURE — 3017F COLORECTAL CA SCREEN DOC REV: CPT | Performed by: SURGERY

## 2024-06-05 PROCEDURE — 3079F DIAST BP 80-89 MM HG: CPT | Performed by: SURGERY

## 2024-06-05 PROCEDURE — 3075F SYST BP GE 130 - 139MM HG: CPT | Performed by: SURGERY

## 2024-06-05 NOTE — PROGRESS NOTES
General Surgery Post-Op Visit    Subjective:  Had EGD, colonoscopy, and esophagram. EGD and esophagram showed some abnormal motility which could be related to her sarcoidosis. She is on remicaid but endorses having some issues with that. She follows with [facility]. I recommended a GI consultation for the abnormal motility and she thinks she wants to establish with GI in [facility] so she will reach out to them.    Objective:  Vitals:    06/05/24 1041   BP: 139/86   Pulse: 92   Resp: 20   Temp: 97.7 °F (36.5 °C)   SpO2: 98%      GEN: awake, alert, NAD  HEENT: NCAT  Resp: breathing comfortably on room air  CV: regular rate, intact distal pulses  Abd: SNTND  Skin: warm and well perfused    Assessment/Plan:  Luhradha Robert is post-op from EGD and colonoscopy.    Benign polyp on colonoscopy, repeat in 10 years  She will schedule consultation with GI at [facility] regarding her esophageal dysmotility     Fuad Handy DO  11:07 AM

## 2024-07-12 RX ORDER — LISINOPRIL 10 MG/1
10 TABLET ORAL DAILY
Qty: 90 TABLET | Refills: 0 | Status: SHIPPED | OUTPATIENT
Start: 2024-07-12

## 2024-07-12 NOTE — TELEPHONE ENCOUNTER
Medication Refill Request    LOV 4/17/2024  NOV 11/6/2024    Lab Results   Component Value Date    CREATININE 0.9 11/09/2023

## 2024-09-23 ENCOUNTER — OFFICE VISIT (OUTPATIENT)
Dept: FAMILY MEDICINE CLINIC | Age: 64
End: 2024-09-23
Payer: COMMERCIAL

## 2024-09-23 VITALS
OXYGEN SATURATION: 99 % | HEART RATE: 82 BPM | DIASTOLIC BLOOD PRESSURE: 80 MMHG | SYSTOLIC BLOOD PRESSURE: 120 MMHG | WEIGHT: 205.6 LBS | TEMPERATURE: 96.8 F | BODY MASS INDEX: 35.29 KG/M2

## 2024-09-23 DIAGNOSIS — R39.9 UTI SYMPTOMS: Primary | ICD-10-CM

## 2024-09-23 LAB
BILIRUBIN, POC: NEGATIVE
BLOOD URINE, POC: NEGATIVE
CLARITY, POC: CLEAR
COLOR, POC: NORMAL
GLUCOSE URINE, POC: NEGATIVE MG/DL
KETONES, POC: NEGATIVE MG/DL
LEUKOCYTE EST, POC: NORMAL
NITRITE, POC: NEGATIVE
PH, POC: 6.5
PROTEIN, POC: NEGATIVE MG/DL
SPECIFIC GRAVITY, POC: <=1.005
UROBILINOGEN, POC: NORMAL MG/DL

## 2024-09-23 PROCEDURE — 99213 OFFICE O/P EST LOW 20 MIN: CPT | Performed by: PHYSICIAN ASSISTANT

## 2024-09-23 PROCEDURE — 3017F COLORECTAL CA SCREEN DOC REV: CPT | Performed by: PHYSICIAN ASSISTANT

## 2024-09-23 PROCEDURE — G8417 CALC BMI ABV UP PARAM F/U: HCPCS | Performed by: PHYSICIAN ASSISTANT

## 2024-09-23 PROCEDURE — 1090F PRES/ABSN URINE INCON ASSESS: CPT | Performed by: PHYSICIAN ASSISTANT

## 2024-09-23 PROCEDURE — G8427 DOCREV CUR MEDS BY ELIG CLIN: HCPCS | Performed by: PHYSICIAN ASSISTANT

## 2024-09-23 PROCEDURE — 3074F SYST BP LT 130 MM HG: CPT | Performed by: PHYSICIAN ASSISTANT

## 2024-09-23 PROCEDURE — G8400 PT W/DXA NO RESULTS DOC: HCPCS | Performed by: PHYSICIAN ASSISTANT

## 2024-09-23 PROCEDURE — 1036F TOBACCO NON-USER: CPT | Performed by: PHYSICIAN ASSISTANT

## 2024-09-23 PROCEDURE — 81002 URINALYSIS NONAUTO W/O SCOPE: CPT | Performed by: PHYSICIAN ASSISTANT

## 2024-09-23 PROCEDURE — 1123F ACP DISCUSS/DSCN MKR DOCD: CPT | Performed by: PHYSICIAN ASSISTANT

## 2024-09-23 PROCEDURE — 3079F DIAST BP 80-89 MM HG: CPT | Performed by: PHYSICIAN ASSISTANT

## 2024-09-23 RX ORDER — NITROFURANTOIN 25; 75 MG/1; MG/1
100 CAPSULE ORAL 2 TIMES DAILY
Qty: 10 CAPSULE | Refills: 0 | Status: SHIPPED
Start: 2024-09-23 | End: 2024-09-26

## 2024-09-23 SDOH — ECONOMIC STABILITY: INCOME INSECURITY: HOW HARD IS IT FOR YOU TO PAY FOR THE VERY BASICS LIKE FOOD, HOUSING, MEDICAL CARE, AND HEATING?: NOT VERY HARD

## 2024-09-23 SDOH — ECONOMIC STABILITY: FOOD INSECURITY: WITHIN THE PAST 12 MONTHS, YOU WORRIED THAT YOUR FOOD WOULD RUN OUT BEFORE YOU GOT MONEY TO BUY MORE.: NEVER TRUE

## 2024-09-23 SDOH — ECONOMIC STABILITY: FOOD INSECURITY: WITHIN THE PAST 12 MONTHS, THE FOOD YOU BOUGHT JUST DIDN'T LAST AND YOU DIDN'T HAVE MONEY TO GET MORE.: NEVER TRUE

## 2024-09-26 LAB
CULTURE: ABNORMAL
SPECIMEN DESCRIPTION: ABNORMAL

## 2024-09-26 RX ORDER — CIPROFLOXACIN 500 MG/1
500 TABLET, FILM COATED ORAL 2 TIMES DAILY
Qty: 6 TABLET | Refills: 0 | Status: SHIPPED | OUTPATIENT
Start: 2024-09-26 | End: 2024-09-29

## 2024-10-04 DIAGNOSIS — E11.9 TYPE 2 DIABETES MELLITUS WITHOUT COMPLICATION, WITHOUT LONG-TERM CURRENT USE OF INSULIN (HCC): ICD-10-CM

## 2024-10-04 DIAGNOSIS — I10 ESSENTIAL HYPERTENSION: ICD-10-CM

## 2024-10-04 NOTE — TELEPHONE ENCOUNTER
Medication Refill Request    LOV 9/23/2024  NOV 11/6/2024    Lab Results   Component Value Date    CREATININE 0.9 11/09/2023

## 2024-10-04 NOTE — TELEPHONE ENCOUNTER
Medication Refill Request    LOV 9/23/2024  NOV 10/4/2024    Lab Results   Component Value Date    CREATININE 0.9 11/09/2023

## 2024-10-05 RX ORDER — SPIRONOLACTONE 25 MG/1
25 TABLET ORAL 2 TIMES DAILY
Qty: 90 TABLET | Refills: 1 | Status: SHIPPED | OUTPATIENT
Start: 2024-10-05

## 2024-10-05 RX ORDER — LISINOPRIL 10 MG/1
10 TABLET ORAL DAILY
Qty: 90 TABLET | Refills: 1 | Status: SHIPPED | OUTPATIENT
Start: 2024-10-05

## 2024-10-19 DIAGNOSIS — R13.14 PHARYNGOESOPHAGEAL DYSPHAGIA: ICD-10-CM

## 2024-10-21 RX ORDER — FAMOTIDINE 20 MG/1
20 TABLET, FILM COATED ORAL 2 TIMES DAILY
Qty: 60 TABLET | Refills: 1 | Status: SHIPPED | OUTPATIENT
Start: 2024-10-21

## 2024-10-21 NOTE — TELEPHONE ENCOUNTER
Name of Medication(s) Requested:  Requested Prescriptions     Pending Prescriptions Disp Refills    famotidine (PEPCID) 20 MG tablet [Pharmacy Med Name: Famotidine Oral Tablet 20 MG] 60 tablet 0     Sig: TAKE 1 TABLET BY MOUTH 2 TIMES A DAY       Medication is on current medication list Yes    Dosage and directions were verified? Yes    Quantity verified: 30 day supply     Pharmacy Verified?  Yes    Last Appointment:  4/17/2024    Future appts:  Future Appointments   Date Time Provider Department Center   11/6/2024  1:00 PM Neela Benites MD [facility] DEP        (If no appt send self scheduling link. .REFILLAPPT)  Scheduling request sent?     [] Yes  [x] No    Does patient need updated?  [] Yes  [x] No

## 2024-11-03 SDOH — HEALTH STABILITY: PHYSICAL HEALTH
ON AVERAGE, HOW MANY DAYS PER WEEK DO YOU ENGAGE IN MODERATE TO STRENUOUS EXERCISE (LIKE A BRISK WALK)?: PATIENT DECLINED

## 2024-11-03 ASSESSMENT — PATIENT HEALTH QUESTIONNAIRE - PHQ9
SUM OF ALL RESPONSES TO PHQ9 QUESTIONS 1 & 2: 0
SUM OF ALL RESPONSES TO PHQ QUESTIONS 1-9: 0
2. FEELING DOWN, DEPRESSED OR HOPELESS: NOT AT ALL
SUM OF ALL RESPONSES TO PHQ QUESTIONS 1-9: 0
1. LITTLE INTEREST OR PLEASURE IN DOING THINGS: NOT AT ALL
SUM OF ALL RESPONSES TO PHQ QUESTIONS 1-9: 0
SUM OF ALL RESPONSES TO PHQ QUESTIONS 1-9: 0

## 2024-11-03 ASSESSMENT — LIFESTYLE VARIABLES
HOW OFTEN DO YOU HAVE A DRINK CONTAINING ALCOHOL: 98
HOW OFTEN DO YOU HAVE A DRINK CONTAINING ALCOHOL: PATIENT DECLINED

## 2024-11-06 ENCOUNTER — OFFICE VISIT (OUTPATIENT)
Dept: FAMILY MEDICINE CLINIC | Age: 64
End: 2024-11-06

## 2024-11-06 VITALS
WEIGHT: 202 LBS | SYSTOLIC BLOOD PRESSURE: 136 MMHG | HEART RATE: 83 BPM | BODY MASS INDEX: 37.17 KG/M2 | OXYGEN SATURATION: 95 % | TEMPERATURE: 97.4 F | DIASTOLIC BLOOD PRESSURE: 72 MMHG | HEIGHT: 62 IN

## 2024-11-06 DIAGNOSIS — F41.9 ANXIETY: ICD-10-CM

## 2024-11-06 DIAGNOSIS — Z00.00 MEDICARE ANNUAL WELLNESS VISIT, SUBSEQUENT: Primary | ICD-10-CM

## 2024-11-06 DIAGNOSIS — L40.9 PSORIASIS: ICD-10-CM

## 2024-11-06 DIAGNOSIS — E78.00 HYPERCHOLESTEROLEMIA: ICD-10-CM

## 2024-11-06 DIAGNOSIS — D84.9 IMMUNOSUPPRESSED STATUS (HCC): ICD-10-CM

## 2024-11-06 DIAGNOSIS — M79.7 FIBROMYALGIA: ICD-10-CM

## 2024-11-06 DIAGNOSIS — D86.9 SARCOIDOSIS: ICD-10-CM

## 2024-11-06 DIAGNOSIS — E11.9 TYPE 2 DIABETES MELLITUS WITHOUT COMPLICATION, WITHOUT LONG-TERM CURRENT USE OF INSULIN (HCC): ICD-10-CM

## 2024-11-06 DIAGNOSIS — R13.19 ESOPHAGEAL DYSPHAGIA: ICD-10-CM

## 2024-11-06 DIAGNOSIS — I10 PRIMARY HYPERTENSION: ICD-10-CM

## 2024-11-06 DIAGNOSIS — E55.9 VITAMIN D DEFICIENCY: ICD-10-CM

## 2024-11-06 LAB — HBA1C MFR BLD: 6.2 %

## 2024-11-06 NOTE — PROGRESS NOTES
Medicare Annual Wellness Visit    Luh Robert is here for Medicare AWV, Hypertension, and Diabetes    Assessment & Plan  1. Annual Wellness Visit.  Her comprehensive metabolic panel (CMP) results are within normal limits, indicating healthy liver and kidney function. Her glomerular filtration rate (GFR) is 72 mL/min, which is above the desired threshold of 60. Electrolyte levels, including sodium, potassium, and chloride, are also within normal ranges. Complete blood count (CBC) results are normal, indicating healthy bone marrow function. Her angiotensin-converting enzyme (ACE) level is less than 10, which is within the normal range for sarcoidosis. Her A1c level is 6.2, which is below the target of 7. She will continue her current regimen of metformin and lisinopril.    2. Anxiety.  Her anxiety is managed through progressive relaxation techniques and breathing exercises. She reports that her sarcoidosis contributes significantly to her anxiety levels.    3. Hypertension.  Her blood pressure readings are stable, with a recent reading of 136/72 mmHg in the office and typically in the 120s/70s at home. She will continue her current medication, lisinopril.    4. Sarcoidosis.  Her sarcoidosis flares occasionally, affecting her breathing, blood pressure, and blood sugar levels. She is currently on methotrexate and Remicade infusions, which have helped manage her symptoms. She will continue with her current treatment regimen.    5. Psoriasis.  Her psoriasis has improved significantly with the use of immunosuppressive medications, including methotrexate and Remicade. She will continue with her current treatment regimen.    6. Fibromyalgia.  She continues to experience muscle aches and pain associated with fibromyalgia. No changes in her current management plan were discussed.    7. Gastroesophageal Reflux Disease (GERD).  She has been taking Tums for GERD, which has helped reduce her symptoms. She was advised to take Tums

## 2024-11-08 NOTE — PATIENT INSTRUCTIONS
goals. Many people expect to lose much more weight than is likely. A weight loss of 5% to 10% of your body weight may be enough to improve your health.  Get family and friends involved to provide support. Talk to them about why you are trying to lose weight, and ask them to help. They can help by participating in exercise and having meals with you, even if they may be eating something different.  Find what works best for you. If you do not have time or do not like to cook, a program that offers meal replacement bars or shakes may be better for you. Or if you like to prepare meals, finding a plan that includes daily menus and recipes may be best.  Ask your doctor about other health professionals who can help you achieve your weight loss goals.  A dietitian can help you make healthy changes in your diet.  An exercise specialist or  can help you develop a safe and effective exercise program.  A counselor or psychiatrist can help you cope with issues such as depression, anxiety, or family problems that can make it hard to focus on weight loss.  Consider joining a support group for people who are trying to lose weight. Your doctor can suggest groups in your area.  Where can you learn more?  Go to https://www.[employer].net/patientEd and enter U357 to learn more about \"Starting a Weight Loss Plan: Care Instructions.\"  Current as of: September 20, 2023  Content Version: 14.2  © 2024 [employer].   Care instructions adapted under license by [employer]. If you have questions about a medical condition or this instruction, always ask your healthcare professional. [employer] disclaims any warranty or liability for your use of this information.           A Healthy Heart: Care Instructions  Overview     Coronary artery disease, also called heart disease, occurs when a substance called plaque builds up in the vessels that supply oxygen-rich blood to your heart muscle. This can narrow the

## 2024-12-26 DIAGNOSIS — R13.14 PHARYNGOESOPHAGEAL DYSPHAGIA: ICD-10-CM

## 2024-12-26 RX ORDER — FAMOTIDINE 20 MG/1
20 TABLET, FILM COATED ORAL 2 TIMES DAILY
Qty: 60 TABLET | Refills: 1 | Status: SHIPPED | OUTPATIENT
Start: 2024-12-26

## 2024-12-26 NOTE — TELEPHONE ENCOUNTER
Name of Medication(s) Requested:  Requested Prescriptions     Pending Prescriptions Disp Refills    famotidine (PEPCID) 20 MG tablet 60 tablet 1     Sig: Take 1 tablet by mouth 2 times daily       Medication is on current medication list Yes    Dosage and directions were verified? Yes    Quantity verified: 90 day supply     Pharmacy Verified?  Yes    Last Appointment:  11/6/2024    Future appts:  Future Appointments   Date Time Provider Department Center   5/9/2025 10:00 AM [facility]        (If no appt send self scheduling link. .REFILLAPPT)  Scheduling request sent?     [] Yes  [x] No    Does patient need updated?  [] Yes  [x] No   negative...

## 2025-01-14 SDOH — ECONOMIC STABILITY: TRANSPORTATION INSECURITY
IN THE PAST 12 MONTHS, HAS THE LACK OF TRANSPORTATION KEPT YOU FROM MEDICAL APPOINTMENTS OR FROM GETTING MEDICATIONS?: PATIENT DECLINED

## 2025-01-14 SDOH — ECONOMIC STABILITY: FOOD INSECURITY: WITHIN THE PAST 12 MONTHS, THE FOOD YOU BOUGHT JUST DIDN'T LAST AND YOU DIDN'T HAVE MONEY TO GET MORE.: PATIENT DECLINED

## 2025-01-14 SDOH — ECONOMIC STABILITY: INCOME INSECURITY: IN THE LAST 12 MONTHS, WAS THERE A TIME WHEN YOU WERE NOT ABLE TO PAY THE MORTGAGE OR RENT ON TIME?: PATIENT DECLINED

## 2025-01-14 SDOH — ECONOMIC STABILITY: FOOD INSECURITY: WITHIN THE PAST 12 MONTHS, YOU WORRIED THAT YOUR FOOD WOULD RUN OUT BEFORE YOU GOT MONEY TO BUY MORE.: PATIENT DECLINED

## 2025-01-14 ASSESSMENT — PATIENT HEALTH QUESTIONNAIRE - PHQ9
SUM OF ALL RESPONSES TO PHQ QUESTIONS 1-9: 0
1. LITTLE INTEREST OR PLEASURE IN DOING THINGS: NOT AT ALL
1. LITTLE INTEREST OR PLEASURE IN DOING THINGS: NOT AT ALL
2. FEELING DOWN, DEPRESSED OR HOPELESS: NOT AT ALL
2. FEELING DOWN, DEPRESSED OR HOPELESS: NOT AT ALL
SUM OF ALL RESPONSES TO PHQ QUESTIONS 1-9: 0
SUM OF ALL RESPONSES TO PHQ9 QUESTIONS 1 & 2: 0
SUM OF ALL RESPONSES TO PHQ QUESTIONS 1-9: 0
SUM OF ALL RESPONSES TO PHQ9 QUESTIONS 1 & 2: 0
SUM OF ALL RESPONSES TO PHQ QUESTIONS 1-9: 0

## 2025-01-17 ENCOUNTER — OFFICE VISIT (OUTPATIENT)
Dept: FAMILY MEDICINE CLINIC | Age: 65
End: 2025-01-17

## 2025-01-17 VITALS
DIASTOLIC BLOOD PRESSURE: 82 MMHG | HEIGHT: 64 IN | HEART RATE: 84 BPM | TEMPERATURE: 98.4 F | RESPIRATION RATE: 16 BRPM | BODY MASS INDEX: 34.49 KG/M2 | OXYGEN SATURATION: 100 % | SYSTOLIC BLOOD PRESSURE: 124 MMHG | WEIGHT: 202 LBS

## 2025-01-17 DIAGNOSIS — R21 RASH AND OTHER NONSPECIFIC SKIN ERUPTION: Primary | ICD-10-CM

## 2025-01-17 RX ORDER — LORATADINE 10 MG/1
10 TABLET ORAL DAILY
Qty: 30 TABLET | Refills: 0 | Status: SHIPPED | OUTPATIENT
Start: 2025-01-17

## 2025-01-17 RX ORDER — TRIAMCINOLONE ACETONIDE 0.25 MG/G
OINTMENT TOPICAL
Qty: 80 G | Refills: 0 | Status: SHIPPED | OUTPATIENT
Start: 2025-01-17 | End: 2025-01-24

## 2025-01-17 NOTE — PROGRESS NOTES
Chief Complaint:   Rash (On head and still having on forehead and scalp )    History of Present Illness   Source of history provided by:  patient.      Luh Robert is a 64 y.o. old female who has a past medical history of:   Past Medical History:   Diagnosis Date    Abdominal pain     Anxiety     Diabetes mellitus (HCC)     Diplopia     Fibromyalgia     Heartburn     HTN (hypertension)     PMR (polymyalgia rheumatica) (HCC)     Psoriasis     Pulmonary sarcoidosis (HCC)     follows with Dr Murphy at [facility]    Shortness of breath     with 'chest cold'    Presents for evaluation of a rash to the forehead and scalp, which pt first noticed a month ago. States that it becomes itchy, red, and crusted/flaking. Not improving. Has had a few on her neck. Does have a history of shingles and takes acyclovir. Denies any blistering. Denies any known cause for the rash including any new soaps, detergents, lotions, foods, or medications. Since onset the symptoms have progressed. Sees dermatology at the end of the month. Denies any bleeding or drainage. Denies any lymphangitic streaking, fever, chills, HA , dyspnea, dysphagia, recent illness, myalgias, vomiting, or lethargy.     ROS    Unless otherwise stated in this report or unable to obtain because of the patient's clinical or mental status as evidenced by the medical record, this patients's positive and negative responses for Review of Systems, constitutional, psych, eyes, ENT, cardiovascular, respiratory, gastrointestinal, neurological, genitourinary, musculoskeletal, integument systems and systems related to the presenting problem are either stated in the preceding or were not pertinent or were negative for the symptoms and/or complaints related to the medical problem.    Past Surgical History:  has a past surgical history that includes bladder suspension; Tubal ligation; [place] tooth extraction (1980); Colonoscopy (07/14/2015); Endoscopy, colon, diagnostic (07/14/2015); Breast

## 2025-03-05 DIAGNOSIS — R13.14 PHARYNGOESOPHAGEAL DYSPHAGIA: ICD-10-CM

## 2025-03-06 RX ORDER — FAMOTIDINE 20 MG/1
20 TABLET, FILM COATED ORAL 2 TIMES DAILY
Qty: 60 TABLET | Refills: 1 | Status: SHIPPED | OUTPATIENT
Start: 2025-03-06

## 2025-03-06 NOTE — TELEPHONE ENCOUNTER
Name of Medication(s) Requested:  Requested Prescriptions     Pending Prescriptions Disp Refills    famotidine (PEPCID) 20 MG tablet 60 tablet 1     Sig: Take 1 tablet by mouth 2 times daily       Medication is on current medication list Yes    Dosage and directions were verified? Yes    Quantity verified: 90 day supply     Pharmacy Verified?  Yes    Last Appointment:  11/6/2024    Future appts:  Future Appointments   Date Time Provider Department Center   5/9/2025 10:00 [facility]        (If no appt send self scheduling link. .REFILLAPPT)  Scheduling request sent?     [] Yes  [x] No    Does patient need updated?  [] Yes  [x] No

## 2025-04-01 DIAGNOSIS — I10 ESSENTIAL HYPERTENSION: ICD-10-CM

## 2025-04-01 RX ORDER — LISINOPRIL 10 MG/1
10 TABLET ORAL DAILY
Qty: 90 TABLET | Refills: 1 | Status: SHIPPED | OUTPATIENT
Start: 2025-04-01 | End: 2025-06-30

## 2025-04-01 RX ORDER — SPIRONOLACTONE 25 MG/1
25 TABLET ORAL 2 TIMES DAILY
Qty: 90 TABLET | Refills: 1 | Status: SHIPPED | OUTPATIENT
Start: 2025-04-01 | End: 2025-06-30

## 2025-04-01 NOTE — TELEPHONE ENCOUNTER
Name of Medication(s) Requested:  Requested Prescriptions     Pending Prescriptions Disp Refills    spironolactone (ALDACTONE) 25 MG tablet [Pharmacy Med Name: Spironolactone Oral Tablet 25 MG] 90 tablet 0     Sig: TAKE 1 TABLET BY MOUTH 2 TIMES A DAY       Medication is on current medication list Yes    Dosage and directions were verified? Yes    Quantity verified: 90 day supply     Pharmacy Verified?  Yes    Last Appointment:  11/6/2024    Future appts:  Future Appointments   Date Time Provider Department Center   5/7/2025 10:40 AM Neela Benites MD [place] [facility] DEP        (If no appt send self scheduling link. .REFILLAPPT)  Scheduling request sent?     [] Yes  [x] No    Does patient need updated?  [] Yes  [x] No

## 2025-04-01 NOTE — TELEPHONE ENCOUNTER
Name of Medication(s) Requested:  Requested Prescriptions     Pending Prescriptions Disp Refills    lisinopril (PRINIVIL;ZESTRIL) 10 MG tablet 90 tablet 1     Sig: Take 1 tablet by mouth daily    spironolactone (ALDACTONE) 25 MG tablet 90 tablet 1     Sig: Take 1 tablet by mouth 2 times daily       Medication is on current medication list Yes    Dosage and directions were verified? Yes    Quantity verified: 90 day supply     Pharmacy Verified?  Yes    Last Appointment:  11/6/2024    Future appts:  Future Appointments   Date Time Provider Department Center   5/7/2025 10:40 AM Neela Benites MD [place] [facility] DEP        (If no appt send self scheduling link. .REFILLAPPT)  Scheduling request sent?     [x] Yes  [x] No    Does patient need updated?  [] Yes  [x] No

## 2025-04-02 DIAGNOSIS — E11.9 TYPE 2 DIABETES MELLITUS WITHOUT COMPLICATION, WITHOUT LONG-TERM CURRENT USE OF INSULIN (HCC): ICD-10-CM

## 2025-04-02 RX ORDER — SPIRONOLACTONE 25 MG/1
25 TABLET ORAL 2 TIMES DAILY
Qty: 90 TABLET | Refills: 0 | OUTPATIENT
Start: 2025-04-02

## 2025-04-02 NOTE — TELEPHONE ENCOUNTER
Albumin  3.9 - 4.9 g/dL 4.2    Calcium, Total  8.5 - 10.2 mg/dL 9.9    Bilirubin, Total  0.2 - 1.3 mg/dL 0.4    Alkaline Phosphatase  34 - 123 U/L 71    AST  13 - 35 U/L 29    ALT  7 - 38 U/L 48 High     Glucose  74 - 99 mg/dL 91      CO2  22 - 30 mmol/L 23    Anion Gap  8 - 15 mmol/L 16 High     Estimated Glomerular Filtration Rate  >=60 mL/min/1.73m² 72          Reviewed labs

## 2025-04-02 NOTE — TELEPHONE ENCOUNTER
Name of Medication(s) Requested:  Requested Prescriptions     Pending Prescriptions Disp Refills    metFORMIN (GLUCOPHAGE) 500 MG tablet 90 tablet 1     Sig: Take 1 tablet by mouth daily (with breakfast)       Medication is on current medication list Yes    Dosage and directions were verified? Yes    Quantity verified: 90 day supply     Pharmacy Verified?  Yes    Last Appointment:  11/6/2024    Future appts:  Future Appointments   Date Time Provider Department Center   5/7/2025 10:40 [facility]        (If no appt send self scheduling link. .REFILLAPPT)  Scheduling request sent?     [] Yes  [x] No    Does patient need updated?  [] Yes  [x] No

## 2025-05-07 ENCOUNTER — OFFICE VISIT (OUTPATIENT)
Dept: FAMILY MEDICINE CLINIC | Age: 65
End: 2025-05-07

## 2025-05-07 VITALS
BODY MASS INDEX: 35.6 KG/M2 | SYSTOLIC BLOOD PRESSURE: 110 MMHG | RESPIRATION RATE: 20 BRPM | HEART RATE: 80 BPM | DIASTOLIC BLOOD PRESSURE: 80 MMHG | WEIGHT: 207.4 LBS | OXYGEN SATURATION: 99 % | TEMPERATURE: 97.9 F

## 2025-05-07 DIAGNOSIS — D84.9 IMMUNOSUPPRESSED STATUS: ICD-10-CM

## 2025-05-07 DIAGNOSIS — E78.00 HYPERCHOLESTEROLEMIA: ICD-10-CM

## 2025-05-07 DIAGNOSIS — L40.9 PSORIASIS: ICD-10-CM

## 2025-05-07 DIAGNOSIS — G62.9 SMALL FIBER NEUROPATHY: ICD-10-CM

## 2025-05-07 DIAGNOSIS — I10 PRIMARY HYPERTENSION: ICD-10-CM

## 2025-05-07 DIAGNOSIS — E11.9 TYPE 2 DIABETES MELLITUS WITHOUT COMPLICATION, WITHOUT LONG-TERM CURRENT USE OF INSULIN (HCC): Primary | ICD-10-CM

## 2025-05-07 DIAGNOSIS — M79.7 FIBROMYALGIA: ICD-10-CM

## 2025-05-07 DIAGNOSIS — D86.9 SARCOIDOSIS: ICD-10-CM

## 2025-05-07 LAB
CREATININE URINE: 53.4 MG/DL (ref 29–226)
HBA1C MFR BLD: 6.2 %
MICROALBUMIN/CREAT 24H UR: <12 MG/L (ref 0–20)
MICROALBUMIN/CREAT UR-RTO: <22 MCG/MG CREAT (ref 0–30)

## 2025-05-07 RX ORDER — PEN NEEDLE, DIABETIC 29 G X1/2"
NEEDLE, DISPOSABLE MISCELLANEOUS
COMMUNITY
Start: 2025-03-24

## 2025-05-07 NOTE — PROGRESS NOTES
Luh Robert (:  1960) is a 64 y.o. female, Established patient, here for evaluation of the following chief complaint(s):  Diabetes            Subjective   History of Present Illness  The patient presents for evaluation of neuropathy, elevated liver enzymes, and proteinuria.    She reports experiencing neuropathy, which she attributes to her medication regimen as it was not a pre-existing condition. The neuropathy is predominantly in her lower body but has also manifested in her upper body, hands, and occasionally her tongue. She has been on Gammagard for 8 months, which she discontinued last month due to its ineffectiveness in managing her neuropathy. She experienced a period of 5 to 6 days post-discontinuation where she felt unwell.     She recently underwent blood work, which revealed three abnormalities. She is scheduled for a follow-up scan and Renflexis infusion next month. Despite these interventions, she reports no significant improvement in her condition. She has an upcoming appointment with a neurologist on 2025, as recommended by YARI Lainez. Her last consultation with her pulmonologist, Noemí Murphy, was approximately a year ago. She is currently on methotrexate, having increased the dosage from a previously lower dose. She is also on Remicade (infliximab).    She has not had a diabetic eye exam recently due to her ophthalmologist's snf. She is seeking a new ophthalmologist. She reports that her blood pressure has been consistently low, although she is uncertain if it is excessively low. She manages her medication refills online as needed.    She has been experiencing severe headaches and neck pain, She has not had a brain MRI for several years and is unsure if her neurologist will recommend one.    She has a rash that took over her whole entire body which resolved  and ended up with some kind of weird growth on her chest from it. She is going to see a dermatologist

## 2025-05-12 DIAGNOSIS — R13.14 PHARYNGOESOPHAGEAL DYSPHAGIA: ICD-10-CM

## 2025-05-12 RX ORDER — FAMOTIDINE 20 MG/1
20 TABLET, FILM COATED ORAL 2 TIMES DAILY
Qty: 180 TABLET | Refills: 0 | Status: SHIPPED | OUTPATIENT
Start: 2025-05-12

## 2025-05-12 RX ORDER — FAMOTIDINE 20 MG/1
20 TABLET, FILM COATED ORAL 2 TIMES DAILY
Qty: 60 TABLET | Refills: 0 | OUTPATIENT
Start: 2025-05-12

## 2025-05-12 NOTE — TELEPHONE ENCOUNTER
Name of Medication(s) Requested:  Requested Prescriptions     Pending Prescriptions Disp Refills    famotidine (PEPCID) 20 MG tablet 60 tablet 1     Sig: Take 1 tablet by mouth 2 times daily       Medication is on current medication list Yes    Dosage and directions were verified? Yes    Quantity verified: 30 day supply     Pharmacy Verified?  Yes    Last Appointment:  5/7/2025    Future appts:  Future Appointments   Date Time Provider Department Center   1/7/2026  8:00 AM Vanda Cooper DO [place]        (If no appt send self scheduling link. .REFILLAPPT)  Scheduling request sent?     [] Yes  [x] No    Does patient need updated?  [] Yes  [x] No

## 2025-05-17 PROBLEM — G62.9 SMALL FIBER NEUROPATHY: Status: ACTIVE | Noted: 2025-05-17

## 2025-05-17 ASSESSMENT — ENCOUNTER SYMPTOMS
ALLERGIC/IMMUNOLOGIC NEGATIVE: 1
SHORTNESS OF BREATH: 0
EYES NEGATIVE: 1
COUGH: 0
GASTROINTESTINAL NEGATIVE: 1
CHEST TIGHTNESS: 0
WHEEZING: 0

## (undated) DEVICE — BLOCK BITE 60FR RUBBER ADLT DENTAL

## (undated) DEVICE — FORCEPS BX OVL CUP FEN DISPOSABLE CAP L 160CM RAD JAW 4

## (undated) DEVICE — GRADUATE TRIANG MEASURE 1000ML BLK PRNT

## (undated) DEVICE — FORCEPS BX L240CM JAW DIA2.4MM ORNG L CAP W/ NDL DISP RAD

## (undated) DEVICE — SPONGE GZ W4XL4IN RAYON POLY CVR W/NONWOVEN FAB STRL 2/PK